# Patient Record
Sex: FEMALE | Race: WHITE | NOT HISPANIC OR LATINO | Employment: FULL TIME | URBAN - METROPOLITAN AREA
[De-identification: names, ages, dates, MRNs, and addresses within clinical notes are randomized per-mention and may not be internally consistent; named-entity substitution may affect disease eponyms.]

---

## 2018-07-05 ENCOUNTER — OCCUPATIONAL MEDICINE (OUTPATIENT)
Dept: URGENT CARE | Facility: CLINIC | Age: 28
End: 2018-07-05
Payer: COMMERCIAL

## 2018-07-05 VITALS
BODY MASS INDEX: 20.2 KG/M2 | SYSTOLIC BLOOD PRESSURE: 108 MMHG | HEIGHT: 63 IN | TEMPERATURE: 98.5 F | WEIGHT: 114 LBS | HEART RATE: 97 BPM | DIASTOLIC BLOOD PRESSURE: 62 MMHG | RESPIRATION RATE: 16 BRPM | OXYGEN SATURATION: 97 %

## 2018-07-05 DIAGNOSIS — S39.012A BACK STRAIN, INITIAL ENCOUNTER: ICD-10-CM

## 2018-07-05 PROCEDURE — 99214 OFFICE O/P EST MOD 30 MIN: CPT | Performed by: FAMILY MEDICINE

## 2018-07-05 ASSESSMENT — ENCOUNTER SYMPTOMS
MYALGIAS: 1
BACK PAIN: 1
FALLS: 0
FEVER: 0
FOCAL WEAKNESS: 0

## 2018-07-05 NOTE — PROGRESS NOTES
"Subjective:      Antoinette Guillen is a 28 y.o. 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 )      DOI 7/3/2018, WYATT-> after lifting several heavy dogs at work noticed the following morning she had non-radiating lower and upper back pain.       HPI    Review of Systems   Constitutional: Negative for fever.   Musculoskeletal: Positive for back pain and myalgias. Negative for falls.   Neurological: Negative for focal weakness.          Objective:     /62   Pulse 97   Temp 36.9 °C (98.5 °F)   Resp 16   Ht 1.6 m (5' 3\")   Wt 51.7 kg (114 lb)   SpO2 97%   BMI 20.19 kg/m²      Physical Exam   Constitutional: She appears well-developed. No distress.   HENT:   Head: Normocephalic and atraumatic.   Neck: Neck supple.   Cardiovascular: Regular rhythm.    No murmur heard.  Pulmonary/Chest: Effort normal. No respiratory distress.   Musculoskeletal:        Arms:  Neurological: She is alert. She exhibits normal muscle tone.   Skin: Skin is warm and dry.   Psychiatric: She has a normal mood and affect. Judgment normal.   Nursing note and vitals reviewed.  Bilateral lower extremity strength and sensory intact.  Negative straight leg raise.   Some pain to palp/rom             Assessment/Plan:         1. Back strain, initial encounter         Dx & d/c instructions discussed w/ patient and/or family members. Follow up as scheduled, sooner if needed, ER if worse.      Possible side effects (i.e. Rash, GI upset/constipation, sedation, elevation of BP or sugars) of any medications given discussed.       - 10lb weight limit  - otc aleve             "

## 2018-07-05 NOTE — LETTER
"EMPLOYEE’S CLAIM FOR COMPENSATION/ REPORT OF INITIAL TREATMENT  FORM C-4    EMPLOYEE’S CLAIM - PROVIDE ALL INFORMATION REQUESTED   First Name  Antoinette Last Name  Mindy Birthdate                    1990                Sex  female Claim Number   Home Address  [address]  Age  28 y.o. Height  1.6 m (5' 3\") Weight  51.7 kg (114 lb) [facility]     [facility] [place] Zip  [address] Telephone  [phone] (home)    Mailing Address  [address] [facility] [place]  [address] Primary Language Spoken  English    Insurer  Unknown Third Party   [employer]   Employee's Occupation (Job Title) When Injury or Occupational Disease Occurred      Employer's Name  [employer]  Telephone  [phone]    Employer Address  [address]  Zip  [address]   Date of Injury  07/04/2018        Hour of Injury  1:45 PM Date Employer Notified  7/4/2018 Last Day of Work after Injury or Occupational Disease  7/4/2018 Supervisor to Whom Injury Reported  Judy Santizo   Address or Location of Accident (if applicable)  [[address] [facility]]   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 (Be specific an answer in detail. Use additional sheet if necessary)  Lifting a new [place]   If you believe that you have an occupational disease, when did you first have knowledge of the disability and it relationship to your employment?  07/04/18 Witnesses to the Accident  Judyjamari Santizo      Nature of Injury or Occupational Disease  Strain  Part(s) of Body Injured or Affected  Lower Back Area (Lumbar Area & Lumbo-Sacral), Defer, Defer    I certify that the above is true and correct to the best of my knowledge and that I have provided this information in order to obtain the benefits of [place]  I hereby authorize any physician, chiropractor, surgeon, practitioner, or other person, any hospital, including [facility] or [facility] hospital, any medical service organization, any insurance company, or other institution or organization to release to each other, any medical or other information, including benefits paid or payable, pertinent to this injury or disease, except information relative to diagnosis, treatment and/or counseling for AIDS, psychological conditions, alcohol or controlled substances, for which I must give specific authorization.  A Photostat of this authorization shall be as valid as the original.     Date   Place   Employee’s Signature   THIS REPORT MUST BE COMPLETED AND MAILED WITHIN 3 WORKING DAYS OF TREATMENT   Place  [facility]  Name of Facility  [facility]   Date  7/5/2018 Diagnosis  (S39.012A) Back strain, initial encounter Is there evidence the injured employee was under the influence of alcohol and/or another controlled substance at the time of accident?   Hour  9:20 AM Description of Injury or Disease  The encounter diagnosis was Back strain, initial encounter. No   Treatment  OTC Aleve  10lb weight limit   Have you advised the patient to remain off work five days or more? No   X-Ray Findings      If Yes   From Date  To Date      From information given by the employee, together with medical evidence, can you directly connect this injury or occupational disease as job incurred?  Yes If No Full Duty  No Modified Duty  Yes   Is additional medical care by a physician indicated?  Yes If Modified Duty, Specify any Limitations / Restrictions  NO LIFTING PULLING PUSHING MORE THEN 10LBS.     Do you know of any previous injury or disease contributing to this condition or occupational disease?                            No   Date  7/5/2018 Print Doctor’s Name Jose Manuel Wei M.D. I certify the  "employer’s copy of  this form was mailed on:   Address  [address]s [address] Only     [place] Zip  [address]    Provider’s Tax ID Number  [id number] Telephone  Dept: [phone]        maldonado-[email or web address] Amos M.D.   e-Signature: Dr. Jl Elam, Medical Director Degree  MD        ORIGINAL-TREATING PHYSICIAN OR CHIROPRACTOR    PAGE 2-INSURER/TPA    PAGE 3-EMPLOYER    PAGE 4-EMPLOYEE             Form C-4 (rev10/07)              BRIEF DESCRIPTION OF RIGHTS AND BENEFITS  (Pursuant to [place] 616C.[place])    Notice of Injury or Occupational Disease (Incident Report Form C-1): If an injury or occupational disease (OD) arises out of and in the  course of employment, you must provide written notice to your employer as soon as practicable, but no later than 7 days after the accident or  OD. Your employer shall maintain a sufficient supply of the required forms.    Claim for Compensation (Form C-4): If medical treatment is sought, the form C-4 is available at the place of initial treatment. A completed  \"Claim for Compensation\" (Form C-4) must be filed within 90 days after an accident or OD. The treating physician or chiropractor must,  within 3 working days after treatment, complete and mail to the employer, the employer's insurer and third-party , the Claim for  Compensation.    Medical Treatment: If you require medical treatment for your on-the-job injury or OD, you may be required to select a physician or  chiropractor from a list provided by your workers’ compensation insurer, if it has contracted with an Organization for Managed Care (MCO) or  Preferred Provider Organization (PPO) or providers of health care. If your employer has not entered into a contract with an MCO or PPO, you  may select a physician or chiropractor from the Panel of Physicians and Chiropractors. Any medical costs related to your industrial injury or  OD will be paid by your insurer.    Temporary Total " Disability (TTD): If your doctor has certified that you are unable to work for a period of at least 5 consecutive days, or 5  cumulative days in a 20-day period, or places restrictions on you that your employer does not accommodate, you may be entitled to TTD  compensation.    Temporary Partial Disability (TPD): If the wage you receive upon reemployment is less than the compensation for TTD to which you are  entitled, the insurer may be required to pay you TPD compensation to make up the difference. TPD can only be paid for a maximum of 24  months.    Permanent Partial Disability (PPD): When your medical condition is stable and there is an indication of a PPD as a result of your injury or  OD, within 30 days, your insurer must arrange for an evaluation by a rating physician or chiropractor to determine the degree of your PPD. The  amount of your PPD award depends on the date of injury, the results of the PPD evaluation and your age and wage.    Permanent Total Disability (PTD): If you are medically certified by a treating physician or chiropractor as permanently and totally disabled  and have been granted a PTD status by your insurer, you are entitled to receive monthly benefits not to exceed 66 2/3% of your average  monthly wage. The amount of your PTD payments is subject to reduction if you previously received a PPD award.    Vocational Rehabilitation Services: You may be eligible for vocational rehabilitation services if you are unable to return to the job due to a  permanent physical impairment or permanent restrictions as a result of your injury or occupational disease.    Transportation and Per Dana Reimbursement: You may be eligible for travel expenses and per dana associated with medical treatment.    Reopening: You may be able to reopen your claim if your condition worsens after claim closure.    Appeal Process: If you disagree with a written determination issued by the insurer or the insurer does not  respond to your request, you may  appeal to the Department of Administration, , by following the instructions contained in your determination letter. You must  appeal the determination within 70 days from the date of the determination letter at [address], [address], [place], [place]  [address], or [address], [place], [place] [address]. If you disagree with the  decision, you may appeal to the  Department of Administration, . You must file your appeal within 30 days from the date of the  decision  letter at [address], [address], [place], [place] [address], or [address], [address], [place], [place] [address]. If you  disagree with a decision of an , you may file a petition for judicial review with the District Court. You must do so within 30  days of the Appeal Officer’s decision. You may be represented by an  at your own expense or you may contact the [facility] for possible  representation.    [place]  for Injured Workers (NAIW): If you disagree with a  decision, you may request that NAIW represent you  without charge at an  Hearing. For information regarding denial of benefits, you may contact the [facility] at: [address], [address], [place] [address], [phone], or [address], [place] [address], [phone]    To File a Complaint with the Division: If you wish to file a complaint with the  of the Division of Industrial Relations (DIR),  please contact the Workers’ Compensation Section, [address], [address], [place], [place] [address], telephone [phone]) [phone], or  [address], [place] [address], telephone [phone].    For assistance with Workers’ Compensation Issues: you may contact the Office of the [facility] Consumer Health Assistance, [address], [address], [place], [place] [address], Toll Free 1-509.435.8956, Web site: http://bree.[facility].nv., E-mail  [email or web address].[email or web address].                                                                                                                                                                                                                                   __________________________________________________________________                                                                   _________________                Employee Name / Signature                                                                                                                                                       Date                                                                                                                                                                                                     D-2 (rev. 10/07)

## 2018-07-05 NOTE — LETTER
[address] [place] [address]  Phone:  [phone] - Fax:  [phone]   [facility] Progress Report and Disability Certification  Date of Service: 7/5/2018   No Show:  No  Date / Time of Next Visit: 7/10/2018@10:50am   Claim Information   Patient Name: Antoinette Guillen  Claim Number:     Employer: [employer]  Date of Injury: 7/3/2018     Insurer / TPA: [employer]  ID / SSN:     Occupation:   Diagnosis: The encounter diagnosis was Back strain, initial encounter.    Medical Information   Related to Industrial Injury? 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     Objective Findings:     Pre-Existing Condition(s):     Assessment:   Initial Visit    Status: Additional Care Required  Permanent Disability:No    Plan:      Diagnostics:      Comments:       Disability Information   Status: Released to Restricted Duty    From:  7/5/2018  Through: 7/10/2018 Restrictions are: Temporary   Physical Restrictions   Sitting:    Standing:    Stooping:    Bending:      Squatting:    Walking:    Climbing:    Pushing:      Pulling:    Other:    Reaching Above Shoulder (L):   Reaching Above Shoulder (R):       Reaching Below Shoulder (L):    Reaching Below Shoulder (R):      Not to exceed Weight Limits   Carrying(hrs):   Weight Limit(lb):   Lifting(hrs):   Weight  Limit(lb):     Comments: NO LIFTING PULLING PUSHING MORE THEN 10LBS     Repetitive Actions   Hands: i.e. Fine Manipulations from Grasping:     Feet: i.e. Operating Foot Controls:     Driving / Operate Machinery:     Physician Name: Jose Manuel Wei M.D. Physician Signature: JOSE MANUEL Natarajan M.D. e-Signature: Dr. Jl Elam, Medical Director   Clinic Name / Location: [address] [place] [address] Clinic Phone  Number: Dept: [phone]   Appointment Time: 9:00 Am Visit Start Time: 9:20 AM   Check-In Time:  9:16 Am Visit Discharge Time: 9:34 Am    Original-Treating Physician or Chiropractor    Page 2-Insurer/TPA    Page 3-Employer    Page 4-Employee

## 2018-07-05 NOTE — LETTER
"EMPLOYEE’S CLAIM FOR COMPENSATION/ REPORT OF INITIAL TREATMENT  FORM C-4    EMPLOYEE’S CLAIM - PROVIDE ALL INFORMATION REQUESTED   First Name  Antoinette Last Name  Mindy Birthdate                    1990                Sex  female Claim Number   Home Address  [address] Age  28 y.o. Height  1.6 m (5' 3\") Weight  51.7 kg (114 lb) [facility]     [place] Zip  [address] Telephone  [phone] (home)    Mailing Address  [address] [place] Zip  [address] Primary Language Spoken  English    Insurer  unknown Third Party   [employer]   Employee's Occupation (Job Title) When Injury or Occupational Disease Occurred      Employer's Name  [employer]  Telephone  [phone]    Employer Address  [address]  Zip  [address]    Date of Injury  7/3/2018               Hour of Injury  1:45 PM Date Employer Notified  7/4/2018 Last Day of Work after Injury or Occupational Disease  7/4/2018 Supervisor to Whom Injury Reported  Judy Santizo   Address or Location of Accident (if applicable)  [[address]]   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 (Be specific an answer in detail. Use additional sheet if necessary)  Lifting a new [place]   If you believe that you have an occupational disease, when did you first have knowledge of the disability and it relationship to your employment?  07/04/18 Witnesses to the Accident  Judyjamari Santizo      Nature of Injury or Occupational Disease  Strain  Part(s) of Body Injured or Affected  Lower Back Area (Lumbar Area & Lumbo-Sacral), Defer, Defer    I certify that the above is true and correct to the best of my knowledge and that I have provided this information in order to obtain the benefits of [place]’s [place] Insurance and " [place]).  I hereby authorize any physician, chiropractor, surgeon, practitioner, or other person, any hospital, including [facility] or [facility] hospital, any medical service organization, any insurance company, or other institution or organization to release to each other, any medical or other information, including benefits paid or payable, pertinent to this injury or disease, except information relative to diagnosis, treatment and/or counseling for AIDS, psychological conditions, alcohol or controlled substances, for which I must give specific authorization.  A Photostat of this authorization shall be as valid as the original.     Date   Place   Employee’s Signature   THIS REPORT MUST BE COMPLETED AND MAILED WITHIN 3 WORKING DAYS OF TREATMENT   Place  [facility]  Name of Facility  [facility]   Date  7/5/2018 Diagnosis  (S39.012A) Back strain, initial encounter Is there evidence the injured employee was under the influence of alcohol and/or another controlled substance at the time of accident?   Hour  9:20 AM Description of Injury or Disease  The encounter diagnosis was Back strain, initial encounter. No   Treatment  OTC Aleve  10lb weight limit   Have you advised the patient to remain off work five days or more? No   X-Ray Findings      If Yes   From Date  To Date      From information given by the employee, together with medical evidence, can you directly connect this injury or occupational disease as job incurred?  Yes If No Full Duty  No Modified Duty  Yes   Is additional medical care by a physician indicated?  Yes If Modified Duty, Specify any Limitations / Restrictions  NO LIFTING PULLING PUSHING MORE THEN 10LBS.     Do you know of any previous injury or disease contributing to this condition or occupational disease?                            No   Date  7/5/2018 Print Doctor’s Name Jose Manuel Wei M.D. I  "certify the employer’s copy of  this form was mailed on:   Address  [address]’s Use Only     [place] Zip  [address]    Provider’s Tax ID Number  [id number] Telephone  Dept: [phone]        maldonado-[email or web address] Amos M.D.   e-Signature: Dr. Jl Elam, Medical Director Degree  MD        ORIGINAL-TREATING PHYSICIAN OR CHIROPRACTOR    PAGE 2-INSURER/TPA    PAGE 3-EMPLOYER    PAGE 4-EMPLOYEE             Form C-4 (rev10/07)              BRIEF DESCRIPTION OF RIGHTS AND BENEFITS  (Pursuant to [place] 616C.050)    Notice of Injury or Occupational Disease (Incident Report Form C-1): If an injury or occupational disease (OD) arises out of and in the  course of employment, you must provide written notice to your employer as soon as practicable, but no later than 7 days after the accident or  OD. Your employer shall maintain a sufficient supply of the required forms.    Claim for Compensation (Form C-4): If medical treatment is sought, the form C-4 is available at the place of initial treatment. A completed  \"Claim for Compensation\" (Form C-4) must be filed within 90 days after an accident or OD. The treating physician or chiropractor must,  within 3 working days after treatment, complete and mail to the employer, the employer's insurer and third-party , the Claim for  Compensation.    Medical Treatment: If you require medical treatment for your on-the-job injury or OD, you may be required to select a physician or  chiropractor from a list provided by your workers’ compensation insurer, if it has contracted with an Organization for Managed Care (MCO) or  Preferred Provider Organization (PPO) or providers of health care. If your employer has not entered into a contract with an MCO or PPO, you  may select a physician or chiropractor from the Panel of Physicians and Chiropractors. Any medical costs related to your industrial injury or  OD will be paid by your insurer.    Temporary " Total Disability (TTD): If your doctor has certified that you are unable to work for a period of at least 5 consecutive days, or 5  cumulative days in a 20-day period, or places restrictions on you that your employer does not accommodate, you may be entitled to TTD  compensation.    Temporary Partial Disability (TPD): If the wage you receive upon reemployment is less than the compensation for TTD to which you are  entitled, the insurer may be required to pay you TPD compensation to make up the difference. TPD can only be paid for a maximum of 24  months.    Permanent Partial Disability (PPD): When your medical condition is stable and there is an indication of a PPD as a result of your injury or  OD, within 30 days, your insurer must arrange for an evaluation by a rating physician or chiropractor to determine the degree of your PPD. The  amount of your PPD award depends on the date of injury, the results of the PPD evaluation and your age and wage.    Permanent Total Disability (PTD): If you are medically certified by a treating physician or chiropractor as permanently and totally disabled  and have been granted a PTD status by your insurer, you are entitled to receive monthly benefits not to exceed 66 2/3% of your average  monthly wage. The amount of your PTD payments is subject to reduction if you previously received a PPD award.    Vocational Rehabilitation Services: You may be eligible for vocational rehabilitation services if you are unable to return to the job due to a  permanent physical impairment or permanent restrictions as a result of your injury or occupational disease.    Transportation and Per Dana Reimbursement: You may be eligible for travel expenses and per dana associated with medical treatment.    Reopening: You may be able to reopen your claim if your condition worsens after claim closure.    Appeal Process: If you disagree with a written determination issued by the insurer or the insurer does not  respond to your request, you may  appeal to the Department of Administration, , by following the instructions contained in your determination letter. You must  appeal the determination within 70 days from the date of the determination letter at [address], [address], [place], [place]  [address], or [address], [address], [place], [place] [address]. If you disagree with the  decision, you may appeal to the  Department of Administration, . You must file your appeal within 30 days from the date of the  decision  letter at [address], [address], [place], [place] [address], or [address], [address], [place], [place] [address]. If you  disagree with a decision of an , you may file a petition for judicial review with the District Court. You must do so within 30  days of the Appeal Officer’s decision. You may be represented by an  at your own expense or you may contact the [facility] for possible  representation.    [place]  for Injured Workers (NAIW): If you disagree with a  decision, you may request that NAIW represent you  without charge at an  Hearing. For information regarding denial of benefits, you may contact the [facility] at: [address], [address], [place] [address], [phone], or [address], [address], [place] [address], [phone]    To File a Complaint with the Division: If you wish to file a complaint with the  of the Division of Industrial Relations (DIR),  please contact the Workers’ Compensation Section, [address], [address], [place], [place] [address], telephone [phone], or  [address], [place] [address], telephone [phone].    For assistance with Workers’ Compensation Issues: you may contact the Office of the [facility] Consumer Health Assistance, [address], [address], [place], [place] [address], Toll Free 1-704.881.6697, Web site: http://[email or web address].[email or web address]., E-mail  [email or web address].                                                                                                                                                                                                                                   __________________________________________________________________                                                                   _________________                Employee Name / Signature                                                                                                                                                       Date                                                                                                                                                                                                     D-2 (rev. 10/07)

## 2018-07-10 ENCOUNTER — OCCUPATIONAL MEDICINE (OUTPATIENT)
Dept: OCCUPATIONAL MEDICINE | Facility: CLINIC | Age: 28
End: 2018-07-10
Payer: COMMERCIAL

## 2018-07-10 VITALS
HEIGHT: 63 IN | SYSTOLIC BLOOD PRESSURE: 104 MMHG | HEART RATE: 90 BPM | DIASTOLIC BLOOD PRESSURE: 52 MMHG | WEIGHT: 114 LBS | BODY MASS INDEX: 20.2 KG/M2 | TEMPERATURE: 98.6 F | OXYGEN SATURATION: 99 % | RESPIRATION RATE: 12 BRPM

## 2018-07-10 DIAGNOSIS — S39.012D STRAIN OF LUMBAR REGION, SUBSEQUENT ENCOUNTER: ICD-10-CM

## 2018-07-10 PROCEDURE — 99202 OFFICE O/P NEW SF 15 MIN: CPT | Performed by: PREVENTIVE MEDICINE

## 2018-07-10 ASSESSMENT — PAIN SCALES - GENERAL: PAINLEVEL: 3=SLIGHT PAIN

## 2018-07-10 NOTE — PROGRESS NOTES
"Subjective:      Antoinette Guillen is a 28 y.o. female who presents with Follow-Up ( DOI 07/04/2018 - Back - better - room 2)      DOI 7/4/2018: 29 yo female presents for lower back pain.  She was lifting a large dog in order to groom and felt a pull in her lower back.  She was seen in the urgent care and advised NSAIDs and work restrictions.  She states over the past few days her pain has improved significantly.  She states she only has minimal pain with lifting and certain movements.  Pain relief with over-the-counter NSAIDs.  She feels ready to return to full duty.  Denies any radiating pain, numbness or tingling.     HPI    ROS  ROS: All systems were reviewed on intake form, form was reviewed and signed. See scanned documents in media. Pertinent positives and negatives included in HPI.    PMH: No pertinent past medical history to this problem  MEDS: Medications were reviewed in Epic  ALLERGIES: No Known Allergies  SOCHX: Works as a  at [employer]   FH: No pertinent family history to this problem     Objective:     /52   Pulse 90   Temp 37 °C (98.6 °F)   Resp 12   Ht 1.6 m (5' 3\")   Wt 51.7 kg (114 lb)   SpO2 99%   BMI 20.19 kg/m²      Physical Exam   Constitutional: She is oriented to person, place, and time. She appears well-developed and well-nourished.   Cardiovascular: Normal rate.    Pulmonary/Chest: Effort normal.   Neurological: She is alert and oriented to person, place, and time.   Skin: Skin is warm and dry.   Psychiatric: She has a normal mood and affect. Judgment normal.       Lumbar: No gross deformity.  No tenderness to palpation.  Full range of motion.  Normal gait.  Reflexes intact.  Strength intact.       Assessment/Plan:     1. Strain of lumbar region, subsequent encounter  Released from care  Full duty  Follow-up as needed      "

## 2018-07-10 NOTE — LETTER
[address],   [address] [address]  Phone:  [phone] - Fax:  [phone]   [facility] Progress Report and Disability Certification  Date of Service: 7/10/2018   No Show:  No  Date / Time of Next Visit:  Release from care   Claim Information   Patient Name: Antoinette Guillen  Claim Number:     Employer: [employer]  Date of Injury: 7/4/2018     Insurer / TPA: [employer]  ID / SSN:     Occupation:   Diagnosis: The encounter diagnosis was Strain of lumbar region, subsequent encounter.    Medical Information   Related to Industrial Injury? Yes    Subjective Complaints:  DOI 7/4/2018: 29 yo female presents for lower back pain.  She was lifting a large dog in order to groom and felt a pull in her lower back.  She was seen in the urgent care and advised NSAIDs and work restrictions.  She states over the past few days her pain has improved significantly.  She states she only has minimal pain with lifting and certain movements.  Pain relief with over-the-counter NSAIDs.  She feels ready to return to full duty.  Denies any radiating pain, numbness or tingling.   Objective Findings: Lumbar: No gross deformity.  No tenderness to palpation.  Full range of motion.  Normal gait.  Reflexes intact.  Strength intact.   Pre-Existing Condition(s):     Assessment:   Condition Improved    Status: Discharged /  MMI  Permanent Disability:No    Plan:      Diagnostics:      Comments:  Released from care  Full duty  Follow-up as needed    Disability Information   Status: Released to Full Duty    From:  7/10/2018  Through:   Restrictions are:     Physical Restrictions   Sitting:    Standing:    Stooping:    Bending:      Squatting:    Walking:    Climbing:    Pushing:      Pulling:    Other:    Reaching Above Shoulder (L):   Reaching Above Shoulder (R):       Reaching Below Shoulder (L):    Reaching Below Shoulder (R):      Not to exceed  Weight Limits   Carrying(hrs):   Weight Limit(lb):   Lifting(hrs):   Weight  Limit(lb):     Comments:      Repetitive Actions   Hands: i.e. Fine Manipulations from Grasping:     Feet: i.e. Operating Foot Controls:     Driving / Operate Machinery:     Physician Name: Kai Lay D.O. Physician Signature: minSignTAKAI WASHINGTON D.O. e-Signature: Dr. Jl Elam, Medical Director   Clinic Name / Location: [address],   [address] [place] [address] Clinic Phone Number: Dept: [phone]   Appointment Time: 10:50 Am Visit Start Time: 10:53 AM   Check-In Time:  10:46 Am Visit Discharge Time:  11:27am   Original-Treating Physician or Chiropractor    Page 2-Insurer/TPA    Page 3-Employer    Page 4-Employee

## 2018-08-01 ENCOUNTER — NON-PROVIDER VISIT (OUTPATIENT)
Dept: OCCUPATIONAL MEDICINE | Facility: CLINIC | Age: 28
End: 2018-08-01

## 2018-08-01 DIAGNOSIS — Z02.1 DRUG TESTING, PRE-EMPLOYMENT: ICD-10-CM

## 2018-08-01 DIAGNOSIS — Z02.1 PRE-EMPLOYMENT DRUG SCREENING: ICD-10-CM

## 2018-08-01 LAB
AMP AMPHETAMINE: NORMAL
COC COCAINE: NORMAL
INT CON NEG: NORMAL
INT CON POS: NORMAL
MET METHAMPHETAMINES: NORMAL
OPI OPIATES: NORMAL
PCP PHENCYCLIDINE: NORMAL
POC DRUG COMMENT 753798-OCCUPATIONAL HEALTH: NEGATIVE
THC: NORMAL

## 2018-08-01 PROCEDURE — 80305 DRUG TEST PRSMV DIR OPT OBS: CPT | Performed by: PREVENTIVE MEDICINE

## 2018-11-17 ENCOUNTER — HOSPITAL ENCOUNTER (OUTPATIENT)
Facility: MEDICAL CENTER | Age: 28
End: 2018-11-17
Attending: PHYSICIAN ASSISTANT
Payer: COMMERCIAL

## 2018-11-17 ENCOUNTER — OFFICE VISIT (OUTPATIENT)
Dept: URGENT CARE | Facility: PHYSICIAN GROUP | Age: 28
End: 2018-11-17
Payer: COMMERCIAL

## 2018-11-17 VITALS
HEART RATE: 78 BPM | OXYGEN SATURATION: 93 % | BODY MASS INDEX: 18.61 KG/M2 | RESPIRATION RATE: 14 BRPM | HEIGHT: 63 IN | TEMPERATURE: 97.5 F | DIASTOLIC BLOOD PRESSURE: 68 MMHG | WEIGHT: 105 LBS | SYSTOLIC BLOOD PRESSURE: 100 MMHG

## 2018-11-17 DIAGNOSIS — R58 ECCHYMOSIS: ICD-10-CM

## 2018-11-17 DIAGNOSIS — R63.4 UNEXPLAINED WEIGHT LOSS: ICD-10-CM

## 2018-11-17 LAB
ALBUMIN SERPL BCP-MCNC: 4.5 G/DL (ref 3.2–4.9)
ALBUMIN/GLOB SERPL: 1.7 G/DL
ALP SERPL-CCNC: 30 U/L (ref 30–99)
ALT SERPL-CCNC: 16 U/L (ref 2–50)
ANION GAP SERPL CALC-SCNC: 6 MMOL/L (ref 0–11.9)
APTT PPP: 29.5 SEC (ref 24.7–36)
AST SERPL-CCNC: 20 U/L (ref 12–45)
BASOPHILS # BLD AUTO: 0.2 % (ref 0–1.8)
BASOPHILS # BLD: 0.01 K/UL (ref 0–0.12)
BILIRUB SERPL-MCNC: 0.5 MG/DL (ref 0.1–1.5)
BUN SERPL-MCNC: 11 MG/DL (ref 8–22)
CALCIUM SERPL-MCNC: 9.3 MG/DL (ref 8.5–10.5)
CHLORIDE SERPL-SCNC: 108 MMOL/L (ref 96–112)
CO2 SERPL-SCNC: 26 MMOL/L (ref 20–33)
CREAT SERPL-MCNC: 0.65 MG/DL (ref 0.5–1.4)
EOSINOPHIL # BLD AUTO: 0.04 K/UL (ref 0–0.51)
EOSINOPHIL NFR BLD: 0.8 % (ref 0–6.9)
ERYTHROCYTE [DISTWIDTH] IN BLOOD BY AUTOMATED COUNT: 44.9 FL (ref 35.9–50)
GLOBULIN SER CALC-MCNC: 2.6 G/DL (ref 1.9–3.5)
GLUCOSE SERPL-MCNC: 82 MG/DL (ref 65–99)
HCT VFR BLD AUTO: 41 % (ref 37–47)
HGB BLD-MCNC: 13.3 G/DL (ref 12–16)
IMM GRANULOCYTES # BLD AUTO: 0.02 K/UL (ref 0–0.11)
IMM GRANULOCYTES NFR BLD AUTO: 0.4 % (ref 0–0.9)
INR PPP: 1.21 (ref 0.87–1.13)
LYMPHOCYTES # BLD AUTO: 1.24 K/UL (ref 1–4.8)
LYMPHOCYTES NFR BLD: 23.6 % (ref 22–41)
MCH RBC QN AUTO: 29 PG (ref 27–33)
MCHC RBC AUTO-ENTMCNC: 32.4 G/DL (ref 33.6–35)
MCV RBC AUTO: 89.5 FL (ref 81.4–97.8)
MONOCYTES # BLD AUTO: 0.33 K/UL (ref 0–0.85)
MONOCYTES NFR BLD AUTO: 6.3 % (ref 0–13.4)
NEUTROPHILS # BLD AUTO: 3.62 K/UL (ref 2–7.15)
NEUTROPHILS NFR BLD: 68.7 % (ref 44–72)
NRBC # BLD AUTO: 0 K/UL
NRBC BLD-RTO: 0 /100 WBC
PLATELET # BLD AUTO: 209 K/UL (ref 164–446)
PMV BLD AUTO: 10.8 FL (ref 9–12.9)
POTASSIUM SERPL-SCNC: 3.7 MMOL/L (ref 3.6–5.5)
PROT SERPL-MCNC: 7.1 G/DL (ref 6–8.2)
PROTHROMBIN TIME: 15.4 SEC (ref 12–14.6)
RBC # BLD AUTO: 4.58 M/UL (ref 4.2–5.4)
SODIUM SERPL-SCNC: 140 MMOL/L (ref 135–145)
WBC # BLD AUTO: 5.3 K/UL (ref 4.8–10.8)

## 2018-11-17 PROCEDURE — 85025 COMPLETE CBC W/AUTO DIFF WBC: CPT

## 2018-11-17 PROCEDURE — 99214 OFFICE O/P EST MOD 30 MIN: CPT | Performed by: PHYSICIAN ASSISTANT

## 2018-11-17 PROCEDURE — 80053 COMPREHEN METABOLIC PANEL: CPT

## 2018-11-17 PROCEDURE — 85610 PROTHROMBIN TIME: CPT

## 2018-11-17 PROCEDURE — 85730 THROMBOPLASTIN TIME PARTIAL: CPT

## 2018-11-17 ASSESSMENT — ENCOUNTER SYMPTOMS
DIARRHEA: 0
FALLS: 0
ABDOMINAL PAIN: 0
NAUSEA: 0
FEVER: 0
CHILLS: 0
WEIGHT LOSS: 1
VOMITING: 0
SHORTNESS OF BREATH: 0
DIZZINESS: 0

## 2018-11-17 NOTE — PROGRESS NOTES
"Subjective:      Antoinette Guillen is a 28 y.o. female who presents with Leg Injury (leg brusies  all over )            HPI   Patient presents to urgent care reporting non-traumatic bruising of her legs starting over the past few days. They area non-tender. No history of easy bruising in the past. She also reports she has lost about 10 lbs in the past 2 months unintentionally and has been experiencing night sweats and fatigue. No fevers, chills, body aches, headaches, dizziness, nausea, vomiting, or abdominal pain. She denies family history of bleeding disorders. She has no known medical problems and is UTD on all routine vaccinations.     Review of Systems   Constitutional: Positive for malaise/fatigue and weight loss. Negative for chills and fever.        + night sweats   HENT: Negative for congestion.    Respiratory: Negative for shortness of breath.    Cardiovascular: Negative for chest pain.   Gastrointestinal: Negative for abdominal pain, diarrhea, nausea and vomiting.   Genitourinary: Negative.    Musculoskeletal: Negative for falls and joint pain.   Neurological: Negative for dizziness.        Objective:     /68   Pulse 78   Temp 36.4 °C (97.5 °F) (Temporal)   Resp 14   Ht 1.6 m (5' 3\")   Wt 47.6 kg (105 lb)   SpO2 93%   BMI 18.60 kg/m²      Physical Exam   Constitutional: She is oriented to person, place, and time. She appears well-developed and well-nourished. No distress.   HENT:   Head: Normocephalic and atraumatic.   Eyes: Pupils are equal, round, and reactive to light.   Neck: Normal range of motion.   Cardiovascular: Normal rate.    Pulmonary/Chest: Effort normal.   Musculoskeletal: Normal range of motion.   Neurological: She is alert and oriented to person, place, and time.   Skin: Skin is warm and dry. Ecchymosis noted. She is not diaphoretic.        Significant amount of ecchymosis present on bilateral anterior lower extremities and scant ecchymosis present on bilateral forearms.  "   Psychiatric: She has a normal mood and affect. Her behavior is normal.   Nursing note and vitals reviewed.         PMH:  has no past medical history on file.  MEDS:   Current Outpatient Prescriptions:   •  Etonogestrel (NEXPLANON SC), Inject  as instructed., Disp: , Rfl:   ALLERGIES: No Known Allergies  SURGHX: History reviewed. No pertinent surgical history.  SOCHX:  has an unknown smoking status. She has never used smokeless tobacco. She reports that she uses drugs, including Marijuana. She reports that she does not drink alcohol.  FH: family history is not on file.     Assessment/Plan:     1. Ecchymosis  - CBC WITH DIFFERENTIAL; Future  - COMP METABOLIC PANEL; Future  - PT AND PTT    2. Unexplained weight loss    Will obtain blood work at today's visit, pending results. The patient demonstrated a good understanding and agreed with the treatment plan.

## 2018-11-19 ENCOUNTER — TELEPHONE (OUTPATIENT)
Dept: URGENT CARE | Facility: PHYSICIAN GROUP | Age: 28
End: 2018-11-19

## 2018-11-19 NOTE — TELEPHONE ENCOUNTER
"Attempted to contact patient regarding blood work results, \"this person is not accepting calls at this time\".   Will attempt to contact at another time.   "

## 2018-11-20 ENCOUNTER — TELEPHONE (OUTPATIENT)
Dept: URGENT CARE | Facility: PHYSICIAN GROUP | Age: 28
End: 2018-11-20

## 2018-11-20 DIAGNOSIS — R23.3 SPONTANEOUS ECCHYMOSIS: ICD-10-CM

## 2018-11-20 NOTE — TELEPHONE ENCOUNTER
Spoke to patient and informed her of blood work results. Slightly elevated INR, otherwise normal. She states she is still experiencing night sweats. The bruising is unchanged. Will place referral to follow up with hematology. She states understanding and appreciates the phone call.

## 2018-11-27 ENCOUNTER — HOSPITAL ENCOUNTER (OUTPATIENT)
Dept: LAB | Facility: MEDICAL CENTER | Age: 28
End: 2018-11-27
Attending: INTERNAL MEDICINE
Payer: COMMERCIAL

## 2018-11-27 ENCOUNTER — OFFICE VISIT (OUTPATIENT)
Dept: HEMATOLOGY ONCOLOGY | Facility: MEDICAL CENTER | Age: 28
End: 2018-11-27
Payer: COMMERCIAL

## 2018-11-27 VITALS
WEIGHT: 108.8 LBS | BODY MASS INDEX: 19.28 KG/M2 | TEMPERATURE: 98 F | HEART RATE: 66 BPM | OXYGEN SATURATION: 99 % | SYSTOLIC BLOOD PRESSURE: 102 MMHG | DIASTOLIC BLOOD PRESSURE: 60 MMHG | RESPIRATION RATE: 14 BRPM | HEIGHT: 63 IN

## 2018-11-27 DIAGNOSIS — T14.8XXA BRUISING: ICD-10-CM

## 2018-11-27 DIAGNOSIS — T14.8XXA BRUISING: Primary | ICD-10-CM

## 2018-11-27 LAB
INR PPP: 1.21 (ref 0.87–1.13)
MEDICATIONS NOTED 1688: NORMAL
PLT FUNCTION COL/EPI  1661: 157 SEC (ref 83–170)
PROTHROMBIN TIME: 13.2 SEC (ref 12–14.6)
PROTHROMBIN TIME: 15.4 SEC (ref 12–14.6)
PT IMM NP PPP: 13.8 S (ref 12–14.6)

## 2018-11-27 PROCEDURE — 36415 COLL VENOUS BLD VENIPUNCTURE: CPT

## 2018-11-27 PROCEDURE — 85245 CLOT FACTOR VIII VW RISTOCTN: CPT

## 2018-11-27 PROCEDURE — 99204 OFFICE O/P NEW MOD 45 MIN: CPT | Performed by: INTERNAL MEDICINE

## 2018-11-27 PROCEDURE — 85230 CLOT FACTOR VII PROCONVERTIN: CPT

## 2018-11-27 PROCEDURE — 85246 CLOT FACTOR VIII VW ANTIGEN: CPT

## 2018-11-27 PROCEDURE — 85240 CLOT FACTOR VIII AHG 1 STAGE: CPT

## 2018-11-27 PROCEDURE — 85576 BLOOD PLATELET AGGREGATION: CPT

## 2018-11-27 PROCEDURE — 85611 PROTHROMBIN TEST: CPT

## 2018-11-27 ASSESSMENT — PAIN SCALES - GENERAL: PAINLEVEL: 3=SLIGHT PAIN

## 2018-11-27 NOTE — PROGRESS NOTES
Consult Note: Hematology    Date of consultation: 11/27/2018 9:02 AM    Referring provider: Dang Mccoy P.A.,    Reason for consultation: Excessive bruising.    History of presenting illness:     Dear  Dang Mccoy P.A,    Thank you very much for allowing me to see  Antoinette Nye today. As you know she is a 28 y.o. year old woman who recently presented at urgent care for evaluation of excessive upper and lower extremity bruising. She reports no trauma or excessive physical activity preceding the day she woke up to find multiple large bruises covering her entire legs and less so on the arms. She does carry a tray at work and reports this might be the only trauma to her arms. She otherwise stands for her work but does not lift anything heavy or bump against anything. She recently started developing minor headaches and did take up to 3 over the counter ibuprofen tablets on a few occasions. She does not take any over the counter supplements otherwise. She denies bleeding episodes. Her bruises are currently significantly improved.  She also reports around 15 pound unintentional weight loss over the past year. She states food is not as appetizing to her and she may have skipped meals. Denies alcohol or drug use. Denies history of liver issues. Denies use of coumarin like products or derivatives including warfarin.     Past Medical History:    Bruising    Allergies:  Patient has no known allergies.    Medications:    Current Outpatient Prescriptions   Medication Sig Dispense Refill   • Etonogestrel (NEXPLANON SC) Inject  as instructed.       No current facility-administered medications for this visit.        Social History:     Social History     Social History   • Marital status: Single     Spouse name: N/A   • Number of children: N/A   • Years of education: N/A     Occupational History   • Not on file.     Social History Main Topics   • Smoking status: Unknown If Ever Smoked   • Smokeless tobacco: Never Used  "  • Alcohol use No   • Drug use: Yes     Types: Marijuana      Comment: occ   • Sexual activity: Not on file     Other Topics Concern   • Not on file     Social History Narrative   • No narrative on file       Family History:   Denies family history of bleeding.    Review of Systems:  Constitutional: No fever, chills, weight loss ,malaise/fatigue.    HEENT: No new auditory or visual complaints. No sore throat and neck pain.     Respiratory:No new cough, sputum production, shortness of breath and wheezing.    Cardiovascular: No new chest pain, palpitations, orthopnea and leg swelling.    Gastrointestinal: No heartburn, nausea, vomiting ,abdominal pain, hematochezia or melena     Genitourinary: Negative for dysuria, hematuria    Musculoskeletal: No new arthralgias or myalgias   Skin: Improving bruising.  Neurological: Negative for focal weakness or headaches.    Endo/Heme/Allergies: No abnormal bleed/bruise.    Psychiatric/Behavioral: No new depression/anxiety.    Physical Exam:  /60 (BP Location: Right arm, Patient Position: Sitting)   Pulse 66   Temp 36.7 °C (98 °F) (Oral)   Resp 14   Ht 1.6 m (5' 3\")   Wt 49.3 kg (108 lb 12.8 oz)   SpO2 99%   BMI 19.27 kg/m²   General: No acute distress.  Eyes: Normal conjuctiva and lids. No icterus.  HEENT: Oropharynx clear.   Neck: Supple with no palpable masses.  Lymph nodes: No palpable cervical, supraclavicular or axillary lymphadenopathy.    CVS: regular rate and rhythm, no rubs or gallops.   RESP: Clear to auscultation bilaterally with normal respiratory effort.   ABD: Soft, non tender, non distended, normal bowel sounds, no palpable organomegaly  EXT: Picture of extensive bruising from last week showing impressive, large multiple bruises covering both sides of entire lower extremities. Currently with multiple healing bruises on arms and legs.  CNS: Alert and oriented x3, No focal deficits.  Skin: No rash on visible skin.      Labs:   No results for input(s): " RBC, HEMOGLOBIN, HEMATOCRIT, PLATELETCT, PROTHROMBTM, APTT, INR, IRON, FERRITIN, TOTIRONBC in the last 72 hours.  Lab Results   Component Value Date/Time    SODIUM 140 11/17/2018 02:54 PM    POTASSIUM 3.7 11/17/2018 02:54 PM    CHLORIDE 108 11/17/2018 02:54 PM    CO2 26 11/17/2018 02:54 PM    GLUCOSE 82 11/17/2018 02:54 PM    BUN 11 11/17/2018 02:54 PM    CREATININE 0.65 11/17/2018 02:54 PM        Assessment and Plan:  Antoinette Guillen is a 28 year old woman with no significant past medical history who recently presented with new onset significant bruising limited to upper and lower extremities in the absence of trauma and other evidence of bleeding and no personal or family history of unexpected bleeding.     Recent labs were reviewed showing normal CBC and CMP with slight prolongation in PT. Patient denies coumadin use and her recent hepatic panel was normal which makes significant liver disease unlikely. Her recent increase in use of ibuprofen due to new headaches raises the possibility of platelet inhibition with resulting bruising. Of note, last ibuprofen patient reports taking over three weeks ago.  I will recheck her PT and add a PTT today with mixing studies to begin a work up. Von Willebrand disease panel will be obtained. Rarer causes of prolonged PT include factor VII inhibitors or congenital deficiency. A mixing study will be helpful in this evaluation as well as factor VII activity level. Patient will also have platelet function studies for further evaluation of her bruising.    At current time my recommendation is to avoid ibuprofen or NSAID's due to their platelet inhibiting effects. She was instructed to proceed with the ER with excessive nosebleeds or bleeding elsewhere. She will return for reevaluation in 2 - 3 weeks.    Recommendations:  - PT, PTT, mixing studies, VWD panel, platelet function analysis  - factor VII activity  - avoid ibuprofen and other NSAID's  - return to clinic in 2 -3  Weeks or  sooner if needed    She agreed and verbalized her agreement and understanding with the current plan.  I answered all questions and concerns she has at this time.              Thank you for allowing me to participate in her care.          SIGNATURES:  Mike Serra    CC:  Pcp Pt States None  Dang Mccoy, P.A.-*

## 2018-11-29 LAB
FACT VII ACT/NOR PPP: 55 % (ref 80–181)
FACT VIII ACT/NOR PPP: 117 % (ref 56–191)
VWF AG ACT/NOR PPP IA: 123 % (ref 52–214)
VWF:RCO ACT/NOR PPP PL AGG: 133 % (ref 51–215)

## 2018-12-12 ENCOUNTER — OFFICE VISIT (OUTPATIENT)
Dept: HEMATOLOGY ONCOLOGY | Facility: MEDICAL CENTER | Age: 28
End: 2018-12-12
Payer: COMMERCIAL

## 2018-12-12 VITALS
SYSTOLIC BLOOD PRESSURE: 102 MMHG | TEMPERATURE: 99.1 F | WEIGHT: 104.28 LBS | HEART RATE: 91 BPM | RESPIRATION RATE: 18 BRPM | OXYGEN SATURATION: 98 % | DIASTOLIC BLOOD PRESSURE: 58 MMHG | BODY MASS INDEX: 18.48 KG/M2 | HEIGHT: 63 IN

## 2018-12-12 DIAGNOSIS — R63.4 WEIGHT LOSS, UNINTENTIONAL: ICD-10-CM

## 2018-12-12 DIAGNOSIS — T14.8XXA BRUISING: ICD-10-CM

## 2018-12-12 DIAGNOSIS — D68.2 FACTOR VII DEFICIENCY (HCC): ICD-10-CM

## 2018-12-12 PROCEDURE — 99213 OFFICE O/P EST LOW 20 MIN: CPT | Performed by: INTERNAL MEDICINE

## 2018-12-12 ASSESSMENT — PAIN SCALES - GENERAL: PAINLEVEL: NO PAIN

## 2018-12-12 NOTE — PROGRESS NOTES
Date of visit: 12/12/2018  12:46 PM    Chief Complaint: Excessive bruising.    Identification/Prior relevant history:   Antoinette Guillen is a 28 year old woman who recently presented at urgent care for evaluation of excessive upper and lower extremity bruising. She reports no trauma or excessive physical activity preceding the day she woke up to find multiple large bruises covering her entire legs and less so on the arms. She also reports around 15 pound unintentional weight loss over the past year. She states food is not as appetizing to her and she may have skipped meals. Denies alcohol or drug use. Denies history of liver issues. Denies use of coumarin like products or derivatives including warfarin.     Interim history  Patient continues to report healing bruises with no new bruising at current time. Denies history of bleeding in the past including ulnar surgery at age 21. Denies heavy menstrual periods or excessive bruising with falls as a child. She attributes recent weight loss to her new job which requires significant amount of walking. She states she eats a varied diet but has no appetite.    Past Medical History:      No past medical history.    Past surgical history:       Ulnar surgery at age 21.    Allergies:         Patient has no known allergies.    Medications:         Current Outpatient Prescriptions   Medication Sig Dispense Refill   • Etonogestrel (NEXPLANON SC) Inject  as instructed.       No current facility-administered medications for this visit.        Social History:     Social History     Social History   • Marital status: Single     Spouse name: N/A   • Number of children: N/A   • Years of education: N/A     Occupational History   • Not on file.     Social History Main Topics   • Smoking status: Unknown If Ever Smoked   • Smokeless tobacco: Never Used   • Alcohol use No   • Drug use: Yes     Types: Marijuana      Comment: occ   • Sexual activity: Not on file     Other Topics Concern   •  "Not on file     Social History Narrative   • No narrative on file       Family History:      No family history of bleeding.    Review of Systems:  Constitutional: Negative for fever, chills, weight loss and malaise/fatigue.    HEENT: No new auditory or visual complaints. No sore throat and neck pain.     Respiratory: Negative for cough, sputum production, shortness of breath and wheezing.    Cardiovascular: Negative for chest pain, palpitations, orthopnea and leg swelling.    Gastrointestinal: Negative for heartburn, nausea, vomiting and abdominal pain.    Genitourinary: Negative for dysuria, hematuria    Musculoskeletal: No new arthralgias or myalgias   Skin: Negative for rash and itching.    Neurological: Negative for focal weakness and headaches.    Endo/Heme/Allergies: No abnormal bleed/bruise.    Psychiatric/Behavioral: No new depression/anxiety.    Physical Exam:  /58 (BP Location: Right arm, Patient Position: Sitting, BP Cuff Size: Adult)   Pulse 91   Temp 37.3 °C (99.1 °F) (Temporal)   Resp 18   Ht 1.6 m (5' 3\")   Wt 47.3 kg (104 lb 4.4 oz)   SpO2 98%   BMI 18.47 kg/m²   General: No acute distress.  Eyes: Normal conjuctiva and lids. No icterus.  RESP: normal respiratory effort.   ABD: non distendedEXT: No edema or cyanosis.  CNS: Alert and oriented x3, No focal deficits.  Skin: No rash on visible skin.      Labs:   No visits with results within 1 Week(s) from this visit.   Latest known visit with results is:   Hospital Outpatient Visit on 11/27/2018   Component Date Value Ref Range Status   • Factor VIII Activity 11/27/2018 117  56 - 191 % Final    Comment: REFERENCE INTERVAL: Factor VIII, Activity  Access complete set of age- and/or gender-specific reference  intervals for this test in the [facility] Test Directory  ([email or web address]).  Performed by [facility],  [address] [place] [id number] [phone]  [email or web address], Sesar Murray MD - Lab. Director     • vWF Antigen 11/27/2018 123 "  52 - 214 % Final    Comment: REFERENCE INTERVAL: von Willebrand Factor, Antigen  Access complete set of age- and/or gender-specific reference  intervals for this test in the [facility] Laboratory Test Directory  ([email or web address]).     • VWF Activity 11/27/2018 133  51 - 215 % Final    Comment: REFERENCE INTERVAL: von Willebrand Factor, Activity (RCF)  Access complete set of age- and/or gender-specific reference  intervals for this test in the [facility] Laboratory Test Directory  ([email or web address]).     • Platelet Function Epi 11/27/2018 157.0  83.0 - 170.0 sec Final   • Medications - Platelet Functio 11/27/2018 None   Final    Comment: Whole blood samples are initially screened with collagen/epi-  nephrine to detect platelet dysfunction due to plt defects,  von Willebrand's disease or exposure to platelet inhibiting  agents. If the COL/EPI is elevated, the sample is then tested  with collagen/ADP. If both results are elevated, consideration  should be given to qualitative plt defects, von Willebrand's,  drugs affecting plt function, severe anemia or thrombocyto-  penia. If the COL/EPI is elevated and the COL/ADP is normal,  consideration should be given to aspirin induced platelet  defects, other drugs, low hct, mild thrombocytopenia, mild  von Willebrand's or mild qualitative plt defects.     • Factor VII 11/27/2018 55* 80 - 181 % Final    Comment: REFERENCE INTERVAL: Factor VII, Activity  Access complete set of age- and/or gender-specific reference  intervals for this test in the [facility] Test Directory  ([email or web address]).  Performed by [facility],  [address] [place] [address],[place] [address] [phone]  [email or web address], Sesar Murray MD - Lab. Director     • PT 11/27/2018 15.4* 12.0 - 14.6 sec Final   • INR 11/27/2018 1.21* 0.87 - 1.13 Final    Comment: INR - Non-therapeutic Reference Range: 0.87-1.13  INR - Therapeutic Reference Range: 2.0-4.0     • Normal Protime 11/27/2018 13.2  12.0 - 14.6 sec Final   • Pt Mixing Study  11/27/2018 13.8  12.0 - 14.6 Final     CBC WITH DIFFERENTIAL (Order #[id number]) on 11/17/18  WBC 5.3  4.8 - 10.8 K/uL Final   RBC 4.58  4.20 - 5.40 M/uL Final   Hemoglobin 13.3  12.0 - 16.0 g/dL Final   Hematocrit 41.0  37.0 - 47.0 % Final   MCV 89.5  81.4 - 97.8 fL Final   MCH 29.0  27.0 - 33.0 pg Final   MCHC 32.4   33.6 - 35.0 g/dL Final   RDW 44.9  35.9 - 50.0 fL Final   Platelet Count 209  164 - 446 K/uL Final     PLATELET FUNCTION (Order #[id number]) on 11/27/18  Platelet Function Epi 157.0  83.0 - 170.0 sec Final     VON WILLEBRAND'S PROFILE (Order #[id number]) on 11/27/18  Factor VIII Activity 117  56 - 191 % Final   Comment:      vWF Antigen 123  52 - 214 % Final     PT MIXING STUDY (Order #[id number]) on 11/27/18  PT 15.4   12.0 - 14.6 sec Final   INR 1.21   0.87 - 1.13 Final   Comment:   INR - Non-therapeutic Reference Range: 0.87-1.13   INR - Therapeutic Reference Range: 2.0-4.0    Normal Protime 13.2  12.0 - 14.6 sec Final   Pt Mixing Study 13.8  12.0 - 14.6 Final     FACTOR VII (Order #[id number]) on 11/27/18  Factor VII 55   80 - 181 % Final       Assessment and Plan:  Antoinette Guillen  is a 28 y.o. year old woman who was recently seen for initial consultation to discuss an episode of excessive bruising involving her lower extremities with no prior history of excessive bleeding or bruising including ulnar surgery at age 21. Patient brought pictures on her phone at the time of initial appointment showing extensive large bruises on both legs proximally and distally with no history of reported significant trauma.     Labs done since last visit show slightly prolonged PT, normal PTT with normal vWD profile (multimer analysis not done) and normal platelet function. Factor VII was slightly low with normal mixing study suggesting factor deficiency rather than presece of an inhibitor.     Causes of low factor VII were discussed today and include mild vitamin K deficiency which may impact factor VII before it  affects other factors, mild liver disease or factor deficiency due to an inherited disorder. Inherited factor VII is extremely rare. Liver disease is unlikely given absence of alcohol abuse and normal LFTs. We discussed vitamin K deficiency as the potential cause, which is generally rare in adults. Some conditions which can also be associated with weigh loss may predispose to vitamin K deficiency including malabsorptive conditions such as celiac disease. Given recent 15 pound unintentional belle loss, patient may benefit from further GI evaluation, however it is interesting that she has not had any abdominal complaints. I suggested vitamin K supplements and increasing kcal intake for now. She will return to clinic in 2 -3 months for reevaluation.      She agreed and verbalized  agreement and understanding with the current plan.  I answered all questions and concerns at this time.      SIGNATURES:  Mike Serra    CC:  Pcp Pt States None  No ref. provider found

## 2019-02-13 ENCOUNTER — HOSPITAL ENCOUNTER (OUTPATIENT)
Dept: LAB | Facility: MEDICAL CENTER | Age: 29
End: 2019-02-13
Attending: INTERNAL MEDICINE
Payer: COMMERCIAL

## 2019-02-13 ENCOUNTER — OFFICE VISIT (OUTPATIENT)
Dept: HEMATOLOGY ONCOLOGY | Facility: MEDICAL CENTER | Age: 29
End: 2019-02-13
Payer: COMMERCIAL

## 2019-02-13 VITALS
HEART RATE: 70 BPM | SYSTOLIC BLOOD PRESSURE: 110 MMHG | TEMPERATURE: 98.5 F | WEIGHT: 102.29 LBS | OXYGEN SATURATION: 92 % | DIASTOLIC BLOOD PRESSURE: 56 MMHG | RESPIRATION RATE: 16 BRPM | HEIGHT: 63 IN | BODY MASS INDEX: 18.12 KG/M2

## 2019-02-13 DIAGNOSIS — R23.3 EASY BRUISING: ICD-10-CM

## 2019-02-13 DIAGNOSIS — R63.4 WEIGHT LOSS: ICD-10-CM

## 2019-02-13 LAB
APTT PPP: 28.8 SEC (ref 24.7–36)
BASOPHILS # BLD AUTO: 0.5 % (ref 0–1.8)
BASOPHILS # BLD: 0.03 K/UL (ref 0–0.12)
EOSINOPHIL # BLD AUTO: 0.06 K/UL (ref 0–0.51)
EOSINOPHIL NFR BLD: 1.1 % (ref 0–6.9)
ERYTHROCYTE [DISTWIDTH] IN BLOOD BY AUTOMATED COUNT: 45.6 FL (ref 35.9–50)
ERYTHROCYTE [SEDIMENTATION RATE] IN BLOOD BY WESTERGREN METHOD: 9 MM/HOUR (ref 0–20)
HCT VFR BLD AUTO: 45.5 % (ref 37–47)
HGB BLD-MCNC: 14.5 G/DL (ref 12–16)
IMM GRANULOCYTES # BLD AUTO: 0.05 K/UL (ref 0–0.11)
IMM GRANULOCYTES NFR BLD AUTO: 0.9 % (ref 0–0.9)
INR PPP: 1.09 (ref 0.87–1.13)
LYMPHOCYTES # BLD AUTO: 1.4 K/UL (ref 1–4.8)
LYMPHOCYTES NFR BLD: 24.9 % (ref 22–41)
MCH RBC QN AUTO: 29.1 PG (ref 27–33)
MCHC RBC AUTO-ENTMCNC: 31.9 G/DL (ref 33.6–35)
MCV RBC AUTO: 91.4 FL (ref 81.4–97.8)
MONOCYTES # BLD AUTO: 0.37 K/UL (ref 0–0.85)
MONOCYTES NFR BLD AUTO: 6.6 % (ref 0–13.4)
NEUTROPHILS # BLD AUTO: 3.72 K/UL (ref 2–7.15)
NEUTROPHILS NFR BLD: 66 % (ref 44–72)
NRBC # BLD AUTO: 0 K/UL
NRBC BLD-RTO: 0 /100 WBC
PLATELET # BLD AUTO: 223 K/UL (ref 164–446)
PMV BLD AUTO: 10.2 FL (ref 9–12.9)
PROTHROMBIN TIME: 14.2 SEC (ref 12–14.6)
RBC # BLD AUTO: 4.98 M/UL (ref 4.2–5.4)
WBC # BLD AUTO: 5.6 K/UL (ref 4.8–10.8)

## 2019-02-13 PROCEDURE — 87389 HIV-1 AG W/HIV-1&-2 AB AG IA: CPT

## 2019-02-13 PROCEDURE — 80074 ACUTE HEPATITIS PANEL: CPT

## 2019-02-13 PROCEDURE — 86038 ANTINUCLEAR ANTIBODIES: CPT

## 2019-02-13 PROCEDURE — 36415 COLL VENOUS BLD VENIPUNCTURE: CPT

## 2019-02-13 PROCEDURE — 85652 RBC SED RATE AUTOMATED: CPT

## 2019-02-13 PROCEDURE — 99214 OFFICE O/P EST MOD 30 MIN: CPT | Performed by: INTERNAL MEDICINE

## 2019-02-13 PROCEDURE — 85730 THROMBOPLASTIN TIME PARTIAL: CPT

## 2019-02-13 PROCEDURE — 85025 COMPLETE CBC W/AUTO DIFF WBC: CPT

## 2019-02-13 PROCEDURE — 85610 PROTHROMBIN TIME: CPT

## 2019-02-13 ASSESSMENT — PAIN SCALES - GENERAL: PAINLEVEL: NO PAIN

## 2019-02-13 NOTE — PROGRESS NOTES
Date of visit: 2/13/2019  2:25 PM      Chief Complaint-  Excessive bruising.     Identification/Prior relevant history:   Seen by kenyetta Moon before  Antoinette Guillen is a 28 year old woman who recently presented at urgent care for evaluation of excessive upper and lower extremity bruising. She reports no trauma or excessive physical activity preceding the day she woke up to find multiple large bruises covering her entire legs and less so on the arms. She also reports around 15 pound unintentional weight loss over the past year. She states food is not as appetizing to her and she may have skipped meals. Denies alcohol or drug use. Denies history of liver issues. Denies use of coumarin like products or derivatives including warfarin.   slightly prolonged PT, normal PTT with normal vWD profile (multimer analysis not done) and normal platelet function. Factor VII was slightly low with normal mixing study suggesting factor deficiency rather than presece of an inhibitor    Interim history-she was started on vitamin K tablet by Dr. Serra     she continues to have intermittent bruising issues along with nonspecific weight loss without major abdominal symptoms.  No menorrhagia as she had implantable IUD.    Past Medical History:    No past medical history on file.    Past surgical history:     No past surgical history on file.    Allergies:       Patient has no known allergies.    Medications:         Current Outpatient Prescriptions   Medication Sig Dispense Refill   • Etonogestrel (NEXPLANON SC) Inject  as instructed.       No current facility-administered medications for this visit.          Social History:     Social History     Social History   • Marital status: Single     Spouse name: N/A   • Number of children: N/A   • Years of education: N/A     Occupational History   • Not on file.     Social History Main Topics   • Smoking status: Unknown If Ever Smoked   • Smokeless tobacco: Never Used   • Alcohol use No   •  "Drug use: Yes     Types: Marijuana      Comment: occ   • Sexual activity: Not on file     Other Topics Concern   • Not on file     Social History Narrative   • No narrative on file       Family History:    No family history on file.    Review of Systems:  All other review of systems are negative except what was mentioned above in the HPI.    Constitutional: Negative for fever, chills, weight loss and malaise/fatigue.    HEENT: No new auditory or visual complaints. No sore throat and neck pain.     Respiratory: Negative for cough, sputum production, shortness of breath and wheezing.    Cardiovascular: Negative for chest pain, palpitations, orthopnea and leg swelling.    Gastrointestinal: Negative for heartburn, nausea, vomiting and abdominal pain.    Genitourinary: Negative for dysuria, hematuria    Musculoskeletal: No new arthralgias or myalgias   Skin: Negative for rash and itching.    Neurological: Negative for focal weakness and headaches.    Endo/Heme/Allergies: No abnormal bleed/bruise.    Psychiatric/Behavioral: No new depression/anxiety.    Physical Exam:  Vitals: /56 (BP Location: Right arm, Patient Position: Sitting, BP Cuff Size: Adult)   Pulse 70   Temp 36.9 °C (98.5 °F) (Temporal)   Resp 16   Ht 1.6 m (5' 3\")   Wt 46.4 kg (102 lb 4.7 oz)   SpO2 92%   BMI 18.12 kg/m²     General: Not in acute distress, alert and oriented x 3  HEENT: No pallor, icterus. Oropharynx clear.   Neck: Supple, no palpable masses.  Lymph nodes: No palpable cervical, supraclavicular, axillary or inguinal lymphadenopathy.    CVS: regular rate and rhythm, no rubs or gallops  RESP: Clear to auscultate bilaterally, no wheezing or crackles.   ABD: Soft, non tender, non distended, positive bowel sounds, no palpable organomegaly  EXT: No edema or cyanosis  CNS: Alert and oriented x3, No focal deficits.  Skin- No rash      Labs:   No visits with results within 1 Week(s) from this visit.   Latest known visit with results is: "   Hospital Outpatient Visit on 11/27/2018   Component Date Value Ref Range Status   • Factor VIII Activity 11/27/2018 117  56 - 191 % Final    Comment: REFERENCE INTERVAL: Factor VIII, Activity  Access complete set of age- and/or gender-specific reference  intervals for this test in the [facility] Laboratory Test Directory  ([email or web address].com).  Performed by [facility],  [address], [facility] [place] [address] [phone]  [email or web address], Sesar Murray MD - Lab. Director     • vWF Antigen 11/27/2018 123  52 - 214 % Final    Comment: REFERENCE INTERVAL: von Willebrand Factor, Antigen  Access complete set of age- and/or gender-specific reference  intervals for this test in the [facility] Laboratory Test Directory  ([email or web address]).     • VWF Activity 11/27/2018 133  51 - 215 % Final    Comment: REFERENCE INTERVAL: von Willebrand Factor, Activity (RCF)  Access complete set of age- and/or gender-specific reference  intervals for this test in the [facility] Laboratory Test Directory  ([email or web address]).     • Platelet Function Epi 11/27/2018 157.0  83.0 - 170.0 sec Final   • Medications - Platelet Functio 11/27/2018 None   Final    Comment: Whole blood samples are initially screened with collagen/epi-  nephrine to detect platelet dysfunction due to plt defects,  von Willebrand's disease or exposure to platelet inhibiting  agents. If the COL/EPI is elevated, the sample is then tested  with collagen/ADP. If both results are elevated, consideration  should be given to qualitative plt defects, von Willebrand's,  drugs affecting plt function, severe anemia or thrombocyto-  penia. If the COL/EPI is elevated and the COL/ADP is normal,  consideration should be given to aspirin induced platelet  defects, other drugs, low hct, mild thrombocytopenia, mild  von Willebrand's or mild qualitative plt defects.     • Factor VII 11/27/2018 55* 80 - 181 % Final    Comment: REFERENCE INTERVAL: Factor VII, Activity  Access complete set of age- and/or gender-specific  reference  intervals for this test in the [facility] Laboratory Test Directory  ([email or web address]).  Performed by [facility],  [address] [place] [address] [phone]  [email or web address], Sesar Murray MD - Lab. 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  Platelet functions were adequate.  No obvious aspirin, NSAIDs or antidepressant use.  No obvious von Willebrand disease on screening she has been on vitamin K supplement.  Etiology is unknown.  Follow-up prothrombin time has shown normalization.  She will continue occasional vitamin K supplementation.  It is unclear whether she has any malabsorption symptoms.  Instructed her to follow-up with the GI.  She continues to have nonspecific weight loss.  I will obtain a CT of her chest abdomen pelvis given her unexplained weight loss and able symptoms.  Further testing ordered today including CARLOS, hepatitis and HIV screen subsequently came back negative.  Return to clinic in 3 months  She agreed and verbalized  agreement and understanding with the current plan.  I answered all questions and concerns at this time         Please note that this dictation was created using voice recognition software. I have made every reasonable attempt to correct obvious errors, but I expect that there are errors of grammar and possibly content that I did not discover before finalizing the note.      SIGNATURES:  Angel Cyr    CC:  Pcp Pt States None  No ref. provider found

## 2019-02-14 LAB
HAV IGM SERPL QL IA: NEGATIVE
HBV CORE IGM SER QL: NEGATIVE
HBV SURFACE AG SER QL: NEGATIVE
HCV AB SER QL: NEGATIVE
HIV 1+2 AB+HIV1 P24 AG SERPL QL IA: NON REACTIVE

## 2019-02-15 LAB — NUCLEAR IGG SER QL IA: NORMAL

## 2019-02-19 ENCOUNTER — TELEPHONE (OUTPATIENT)
Dept: HEMATOLOGY ONCOLOGY | Facility: MEDICAL CENTER | Age: 29
End: 2019-02-19

## 2019-02-19 NOTE — TELEPHONE ENCOUNTER
Peer to peer is required for the CT scan that is scheduled for tomorrow. Per Marianne at [facility].     Peer to peer phone #: [phone]  Follow the prompts.     Reference #: [id number]    Please obtain authorization for all three scans: chest, abdomen and pelvis.     I transferred the call to Dr. Cyr.

## 2019-02-20 ENCOUNTER — HOSPITAL ENCOUNTER (OUTPATIENT)
Dept: RADIOLOGY | Facility: MEDICAL CENTER | Age: 29
End: 2019-02-20
Attending: INTERNAL MEDICINE
Payer: COMMERCIAL

## 2019-02-20 ENCOUNTER — TELEPHONE (OUTPATIENT)
Dept: HEMATOLOGY ONCOLOGY | Facility: MEDICAL CENTER | Age: 29
End: 2019-02-20

## 2019-02-20 DIAGNOSIS — R16.1 SPLEEN ENLARGED: ICD-10-CM

## 2019-02-20 DIAGNOSIS — R63.4 WEIGHT LOSS: ICD-10-CM

## 2019-02-20 DIAGNOSIS — R23.3 EASY BRUISING: ICD-10-CM

## 2019-02-20 PROCEDURE — 74177 CT ABD & PELVIS W/CONTRAST: CPT

## 2019-02-20 PROCEDURE — 700117 HCHG RX CONTRAST REV CODE 255: Performed by: INTERNAL MEDICINE

## 2019-02-20 RX ADMIN — IOHEXOL 50 ML: 240 INJECTION, SOLUTION INTRATHECAL; INTRAVASCULAR; INTRAVENOUS; ORAL at 16:21

## 2019-02-20 RX ADMIN — IOHEXOL 80 ML: 350 INJECTION, SOLUTION INTRAVENOUS at 16:21

## 2019-02-21 NOTE — TELEPHONE ENCOUNTER
RN Called patient and let the patient know Dr. Cyr has reviewed the CToverall looks okay.  Spleen is mildly enlarged which does not appear to be anything concerning.  Patient will need a chest xray as well as a U/S of the spleen just prior to a 6 month f/u with Dr. Cyr. RN instructed the patient that an MA would call her tomorrow to help set up scheduling for he imaging as well as the f/u appointment. Patient verbalized understanding at this time.

## 2019-02-23 ENCOUNTER — HOSPITAL ENCOUNTER (OUTPATIENT)
Dept: RADIOLOGY | Facility: MEDICAL CENTER | Age: 29
End: 2019-02-23
Attending: INTERNAL MEDICINE
Payer: COMMERCIAL

## 2019-02-23 DIAGNOSIS — R63.4 WEIGHT LOSS: ICD-10-CM

## 2019-02-23 DIAGNOSIS — R16.1 SPLEEN ENLARGED: ICD-10-CM

## 2019-02-23 PROCEDURE — 71046 X-RAY EXAM CHEST 2 VIEWS: CPT

## 2019-05-08 ENCOUNTER — OFFICE VISIT (OUTPATIENT)
Dept: URGENT CARE | Facility: PHYSICIAN GROUP | Age: 29
End: 2019-05-08
Payer: COMMERCIAL

## 2019-05-08 VITALS
TEMPERATURE: 98.2 F | BODY MASS INDEX: 18.07 KG/M2 | SYSTOLIC BLOOD PRESSURE: 100 MMHG | HEART RATE: 66 BPM | HEIGHT: 63 IN | DIASTOLIC BLOOD PRESSURE: 68 MMHG | WEIGHT: 102 LBS | OXYGEN SATURATION: 96 %

## 2019-05-08 DIAGNOSIS — R10.12 LUQ PAIN: ICD-10-CM

## 2019-05-08 DIAGNOSIS — R11.0 NAUSEA: ICD-10-CM

## 2019-05-08 DIAGNOSIS — Z87.898 HISTORY OF SPLENOMEGALY: ICD-10-CM

## 2019-05-08 PROCEDURE — 99213 OFFICE O/P EST LOW 20 MIN: CPT | Performed by: PHYSICIAN ASSISTANT

## 2019-05-08 RX ORDER — ONDANSETRON 4 MG/1
4 TABLET, FILM COATED ORAL EVERY 6 HOURS PRN
Qty: 12 TAB | Refills: 0 | Status: SHIPPED | OUTPATIENT
Start: 2019-05-08 | End: 2019-05-11

## 2019-05-08 ASSESSMENT — ENCOUNTER SYMPTOMS
MYALGIAS: 0
DIARRHEA: 0
HEADACHES: 0
CHILLS: 0
ARTHRALGIAS: 0
FLANK PAIN: 0
HEMATOCHEZIA: 0
SHORTNESS OF BREATH: 0
NAUSEA: 1
FLATUS: 0
BLOOD IN STOOL: 0
CONSTIPATION: 0
ABDOMINAL PAIN: 1
VOMITING: 0
FEVER: 0
ANOREXIA: 0
BELCHING: 1

## 2019-05-08 NOTE — PROGRESS NOTES
Subjective:   Antoinette Guillen is a 29 y.o. female who presents for Abdominal Pain (cramping, x 1 day)       Patient presents today with LUQ abdominal pain and nausea. She states that she had CT scan in 2/2019 that showed her spleen was enlarged. She has a follow up ultrasound scheduled for tomorrow.       LUQ Pain   This is a new problem. The current episode started yesterday. The onset quality is undetermined. The problem occurs constantly. The problem has been unchanged. The pain is located in the LUQ. The pain is moderate. The quality of the pain is aching and a sensation of fullness. The abdominal pain does not radiate. Associated symptoms include belching and nausea. Pertinent negatives include no anorexia, arthralgias, constipation, diarrhea, dysuria, fever, flatus, frequency, headaches, hematochezia, hematuria, melena, myalgias or vomiting. The pain is aggravated by certain positions and palpation. The pain is relieved by nothing. She has tried nothing for the symptoms. The treatment provided no relief. Prior diagnostic workup includes CT scan (mild splenomegaly).     Review of Systems   Constitutional: Negative for chills and fever.   Respiratory: Negative for shortness of breath.    Gastrointestinal: Positive for abdominal pain and nausea. Negative for anorexia, blood in stool, constipation, diarrhea, flatus, hematochezia, melena and vomiting.   Genitourinary: Negative for dysuria, flank pain, frequency, hematuria and urgency.   Musculoskeletal: Negative for arthralgias and myalgias.   Neurological: Negative for headaches.   All other systems reviewed and are negative.      PMH:  has no past medical history on file.    MEDS:   Current Outpatient Prescriptions:   •  ondansetron (ZOFRAN) 4 MG Tab tablet, Take 1 Tab by mouth every 6 hours as needed for Nausea/Vomiting for up to 3 days., Disp: 12 Tab, Rfl: 0  •  Etonogestrel (NEXPLANON SC), Inject  as instructed., Disp: , Rfl:     ALLERGIES: No Known  "Allergies    SURGHX: History reviewed. No pertinent surgical history.    SOCHX:  has an unknown smoking status. She has never used smokeless tobacco. She reports that she uses drugs, including Marijuana. She reports that she does not drink alcohol.    FH: Reviewed with patient, not pertinent to this visit.     Objective:   /68   Pulse 66   Temp 36.8 °C (98.2 °F) (Temporal)   Ht 1.6 m (5' 3\")   Wt 46.3 kg (102 lb)   SpO2 96%   BMI 18.07 kg/m²   Physical Exam   Constitutional: She is oriented to person, place, and time. She appears well-developed and well-nourished. No distress.   HENT:   Head: Normocephalic and atraumatic.   Nose: Nose normal.   Eyes: Conjunctivae and EOM are normal.   Neck: Normal range of motion. No tracheal deviation present.   Pulmonary/Chest: Effort normal. No respiratory distress.   Abdominal: Soft. Normal appearance and bowel sounds are normal. She exhibits no distension and no mass. There is tenderness in the left upper quadrant. There is no rigidity, no rebound, no guarding and no CVA tenderness.   Musculoskeletal:   ROM normal all four extremities   Neurological: She is alert and oriented to person, place, and time.   Skin: Skin is warm and dry.   Psychiatric: She has a normal mood and affect. Her behavior is normal. Judgment and thought content normal.   Vitals reviewed.      Assessment/Plan:   1. Nausea  - ondansetron (ZOFRAN) 4 MG Tab tablet; Take 1 Tab by mouth every 6 hours as needed for Nausea/Vomiting for up to 3 days.  Dispense: 12 Tab; Refill: 0    2. LUQ pain    3. History of splenomegaly    - Offered splenic US today, patient declines and states she will attend scheduled appointment tomorrow  - Advised to try BRAT diet, advancing as tolerated  - Strict ER precautions given   - Advised to follow up with Oncologist    Differential diagnosis, natural history, supportive care, and indications for immediate follow-up discussed.  "

## 2019-05-09 ENCOUNTER — HOSPITAL ENCOUNTER (OUTPATIENT)
Dept: RADIOLOGY | Facility: MEDICAL CENTER | Age: 29
End: 2019-05-09
Attending: INTERNAL MEDICINE
Payer: COMMERCIAL

## 2019-05-09 DIAGNOSIS — R63.4 WEIGHT LOSS: ICD-10-CM

## 2019-05-09 DIAGNOSIS — R16.1 SPLEEN ENLARGED: ICD-10-CM

## 2019-05-09 PROCEDURE — 76705 ECHO EXAM OF ABDOMEN: CPT

## 2019-05-15 ENCOUNTER — OFFICE VISIT (OUTPATIENT)
Dept: HEMATOLOGY ONCOLOGY | Facility: MEDICAL CENTER | Age: 29
End: 2019-05-15
Payer: COMMERCIAL

## 2019-05-15 VITALS
RESPIRATION RATE: 16 BRPM | BODY MASS INDEX: 17.47 KG/M2 | TEMPERATURE: 97.9 F | DIASTOLIC BLOOD PRESSURE: 62 MMHG | WEIGHT: 104.83 LBS | HEART RATE: 76 BPM | OXYGEN SATURATION: 97 % | SYSTOLIC BLOOD PRESSURE: 102 MMHG | HEIGHT: 65 IN

## 2019-05-15 DIAGNOSIS — R23.3 EASY BRUISING: ICD-10-CM

## 2019-05-15 DIAGNOSIS — D68.2 FACTOR VII DEFICIENCY (HCC): ICD-10-CM

## 2019-05-15 DIAGNOSIS — Z87.42 HISTORY OF MENORRHAGIA: ICD-10-CM

## 2019-05-15 DIAGNOSIS — R16.1 SPLENOMEGALY: ICD-10-CM

## 2019-05-15 DIAGNOSIS — R63.4 WEIGHT LOSS: ICD-10-CM

## 2019-05-15 DIAGNOSIS — R53.83 FATIGUE, UNSPECIFIED TYPE: ICD-10-CM

## 2019-05-15 DIAGNOSIS — R79.1 ABNORMAL BLEEDING TIME: ICD-10-CM

## 2019-05-15 PROCEDURE — 99213 OFFICE O/P EST LOW 20 MIN: CPT | Performed by: INTERNAL MEDICINE

## 2019-05-15 ASSESSMENT — PAIN SCALES - GENERAL: PAINLEVEL: 3=SLIGHT PAIN

## 2019-05-16 PROBLEM — R53.83 FATIGUE: Status: ACTIVE | Noted: 2019-05-15

## 2019-05-16 NOTE — PROGRESS NOTES
Date of visit: 5/15/2019  2:27 PM      Chief Complaint- Excessive bruising and bleeding      Identification/Prior relevant history: Antoinette Guillen  is a 29 y.o. year old female who is here for follow-up of excessive spontaneous bruising of the upper and lower extremities as well as excessive bleeding.  Patient reported waking up with excessive bruising of both her upper lower extremities on November 16, 2018.  She has not had a period for 2 years since being on Norplant but does report history of menorrhagia going through a box of tampons for her periods, which lasted 5 to 6 days.  She also reports excessive bleeding from a tooth extraction approximately 1.5 years ago.  Patient denied taking any antiplatelet or anticoagulation medications.  Her PT was slightly prolonged.  PTT and von Willebrand disease profile (multiple neuro analysis was not done) as well as platelet functions were normal.  Factor VII was noted to be slightly low (55) and normal mixing study suggested a factor deficiency rather than presence of an inhibitor.  Patient also reported on her previous visits a 15 pound unintentional weight loss over the past year.  Patient was started on vitamin K supplementation by Dr. Serra.    Interim history  -Patient now taking vitamin K.  -INR normalized from 1.21 to 1.09.  -2/20/19 CT scan abdomen and pelvis:  1. Mild splenomegaly.  2. No enlarged lymph nodes.  3. No acute inflammatory change in the abdomen or pelvis.  -HIV, hepatitis panel, CARLOS negative.  -No further episodes of spontaneous bruising or significant bleeding.  -She does report breakthrough bleeding/spotting on Norplant recently, which has been mild.  -Has not seen GI yet.  -No more significant weight loss although continuing to have decreased appetite.  -Recently fatigued and having left upper quadrant pain.      Past Medical History:    No past medical history on file.    Past surgical history:     No past surgical history on  "file.    Allergies:       Patient has no known allergies.    Medications:         Current Outpatient Prescriptions   Medication Sig Dispense Refill   • Etonogestrel (NEXPLANON SC) Inject  as instructed.       No current facility-administered medications for this visit.          Social History:     Social History     Social History   • Marital status: Single     Spouse name: N/A   • Number of children: N/A   • Years of education: N/A     Occupational History   • Not on file.     Social History Main Topics   • Smoking status: Unknown If Ever Smoked   • Smokeless tobacco: Never Used   • Alcohol use No   • Drug use: Yes     Types: Marijuana      Comment: occ   • Sexual activity: Not on file     Other Topics Concern   • Not on file     Social History Narrative   • No narrative on file       Family History:    No family history on file.    Review of Systems:  All other review of systems are negative except what was mentioned above in the HPI.    Constitutional: Negative for fever, chills.  Positive for weight loss and fatigue.  HEENT: No new auditory or visual complaints. No sore throat and neck pain.     Respiratory: Negative for cough, sputum production, shortness of breath and wheezing.    Cardiovascular: Negative for chest pain, palpitations, orthopnea and leg swelling.    Gastrointestinal: Negative for heartburn, nausea, vomiting and abdominal pain.    Genitourinary: Negative for dysuria, hematuria    Musculoskeletal: No new arthralgias or myalgias   Skin: Negative for rash and itching.    Neurological: Negative for focal weakness and headaches.    Endo/Heme/Allergies: No new abnormal bleed/bruise.    Psychiatric/Behavioral: No new depression/anxiety.    Physical Exam:  Vitals: /62 (BP Location: Left arm, Patient Position: Sitting, BP Cuff Size: Adult)   Pulse 76   Temp 36.6 °C (97.9 °F) (Temporal)   Resp 16   Ht 1.651 m (5' 5\")   Wt 47.6 kg (104 lb 13.3 oz)   SpO2 97%   BMI 17.44 kg/m²     General: Not " in acute distress, alert and oriented x 3  HEENT: No pallor, icterus. Oropharynx clear.   Neck: Supple, no palpable masses.  Lymph nodes: No palpable cervical, supraclavicular, axillary or inguinal lymphadenopathy.    CVS: regular rate and rhythm, no rubs or gallops  RESP: Clear to auscultate bilaterally, no wheezing or crackles.   ABD: Soft, non distended, positive bowel sounds, mild left upper quadrant tenderness with palpable spleen.  EXT: No edema or cyanosis  CNS: Alert and oriented x3, No focal deficits.  Skin- No rash      Labs:   No visits with results within 1 Week(s) from this visit.   Latest known visit with results is:   Hospital Outpatient Visit on 02/13/2019   Component Date Value Ref Range Status   • WBC 02/13/2019 5.6  4.8 - 10.8 K/uL Final   • RBC 02/13/2019 4.98  4.20 - 5.40 M/uL Final   • Hemoglobin 02/13/2019 14.5  12.0 - 16.0 g/dL Final   • Hematocrit 02/13/2019 45.5  37.0 - 47.0 % Final   • MCV 02/13/2019 91.4  81.4 - 97.8 fL Final   • MCH 02/13/2019 29.1  27.0 - 33.0 pg Final   • MCHC 02/13/2019 31.9* 33.6 - 35.0 g/dL Final   • RDW 02/13/2019 45.6  35.9 - 50.0 fL Final   • Platelet Count 02/13/2019 223  164 - 446 K/uL Final   • MPV 02/13/2019 10.2  9.0 - 12.9 fL Final   • Neutrophils-Polys 02/13/2019 66.00  44.00 - 72.00 % Final   • Lymphocytes 02/13/2019 24.90  22.00 - 41.00 % Final   • Monocytes 02/13/2019 6.60  0.00 - 13.40 % Final   • Eosinophils 02/13/2019 1.10  0.00 - 6.90 % Final   • Basophils 02/13/2019 0.50  0.00 - 1.80 % Final   • Immature Granulocytes 02/13/2019 0.90  0.00 - 0.90 % Final   • Nucleated RBC 02/13/2019 0.00  /100 WBC Final   • Neutrophils (Absolute) 02/13/2019 3.72  2.00 - 7.15 K/uL Final    Includes immature neutrophils, if present.   • Lymphs (Absolute) 02/13/2019 1.40  1.00 - 4.80 K/uL Final   • Monos (Absolute) 02/13/2019 0.37  0.00 - 0.85 K/uL Final   • Eos (Absolute) 02/13/2019 0.06  0.00 - 0.51 K/uL Final   • Baso (Absolute) 02/13/2019 0.03  0.00 - 0.12 K/uL  Final   • Immature Granulocytes (abs) 02/13/2019 0.05  0.00 - 0.11 K/uL Final   • NRBC (Absolute) 02/13/2019 0.00  K/uL Final   • PT 02/13/2019 14.2  12.0 - 14.6 sec Final   • INR 02/13/2019 1.09  0.87 - 1.13 Final    Comment: INR - Non-therapeutic Reference Range: 0.87-1.13  INR - Therapeutic Reference Range: 2.0-4.0     • APTT 02/13/2019 28.8  24.7 - 36.0 sec Final    Therapeutic Heparin Range: 63-96 seconds   • Antinuclear Antibody 02/13/2019 None Detected  None Detected Final    Comment: If suspicion of connective tissue disease is strong and CARLOS EIA is  negative, consider testing for CARLOS by IFA ([id number]).  No antibodies to Anti-Nuclear Antibodies (CARLOS) detected.  The  Extractable Nuclear Antigen Antibodies (RNP, Harrison, SSA, Christina-1 and  SSB) and Double Stranded DNA (dsDNA) Antibody, IgG will not be  performed.  INTERPRETIVE INFORMATION: Anti-Nuclear Antibodies (CARLOS), IgG by  TELMA  Anti-Nuclear Antibodies (CARLOS), IgG by TELMA: CARLOS specimens are  screened using enzyme-linked immunosorbent assay (TELMA)  methodology. All TELMA results reported as Detected are further  tested by indirect fluorescent assay (IFA) using HEp-2 substrate  with an IgG-specific conjugate. The CARLOS TELMA screen is designed  to detect antibodies against dsDNA, histone, SS-A (Ro), SS-B (La),  Harrison, snRNP/Sm, Scl-70, Christina-1, centromere, and an extract of lysed  HEp-2 cells. CARLOS TELMA assays have been reported to have lower  sensitivities than CARLOS IFA for systemic autoimmune rheumatic  diseases (SARD).  N                           egative results do not necessarily rule out SARD.  Performed by [email or web address],  [address] [place] [id number] [phone]  [email or web address], Sesar Murray MD - Lab. Director     • HIV Ag/Ab Combo Assay 02/13/2019 Non Reactive  Non Reactive Final    Comment: Screen is NEGATIVE for p24 antigen and HIV 1/O/2 antibodies.  A negative screen does not preclude the possibility of exposure  or infection.  Consider retesting  in high-risk patients, if  clinically indicated.     • Hepatitis B Surface Antigen 02/13/2019 Negative  Negative Final    Comment: It has been reported that certain assays will not detect all HBV  mutants.  If acute or chronic HBV infection is suspected and the  HBsAg result is negative, it is recommended that other serological  markers be tested to confirm the HBsAg nonreactivity.  HBsAg test  results should always be assessed in conjunction with the patient's  medical history, clinical examination, and other findings.     • Hepatitis B Cors Ab,IgM 02/13/2019 Negative  Negative Final    Comment: The ADVIA Centaur HBc IgM assay is a diagnostic test for the  qualitative determination of IgM response to hepatitis B virus  core antigen in human serum.  The results from this or any  other diagnostic kit should be used and interpreted only in  the context of the overall clinical picture.  A negative test  result does not exclude the possibility of exposure to hepatitis  B virus.     • Hepatitis A Virus Ab, IgM 02/13/2019 Negative  Negative Final    Comment: The ADVIA Centaur HAV IgM assay is a diagnostic immunoassay for  the qualitative determination of IgM response to the hepatitis A  virus in human serum.  The results from this or any other  diagnostic kit should be used and interpreted only in the context  of the overall clinical picture.  A negative test result does not  exclude the possibility of exposure to hepatitis A virus.     • Hepatitis C Antibody 02/13/2019 Negative  Negative Final    Comment: The ADVIA Centaur HCV assay is limited to the detection of IgG  antibodies to hepatitis C virus in human serum.  The results  from this or any other diagnostic kit should be used and interpreted  only in the context of the overall clinical picture.  A negative  test result does not exclude the possibility of exposure to  hepatitis C virus.     • Sed Rate Westergren 02/13/2019 9  0 - 20 mm/hour Final              Assessment and Plan:  1. Abnormal bleeding time  Prothrombin Time    VITAMIN K    FACTOR VII    REFERRAL TO GASTROENTEROLOGY   2. History of menorrhagia     3. Easy bruising     4. Weight loss     5. Factor VII deficiency (HCC)     6. Splenomegaly     7. Fatigue, unspecified type       No further episodes of spontaneous bruising.  Her INR has stabilized on vitamin K replacement therapy.  Suspect mild factor VII deficiency may be secondary to vitamin K deficiency.  Etiology of vitamin K deficiency is unclear at this time but could be secondary to bacterial overgrowth the small bowel.  She does have a history of excessive bleeding with menorrhagia using up to a box of tampons per menstruation.  She has not any periods for 2 years while being on Norplant but is now experiencing some breakthrough bleeding, which appears to be mild.  Also has history of excessive gum bleeding from dental work 1-1/2 years ago.  Weight loss appears to have stabilized.  CT scan did show some mild splenomegaly.  In conjunction with her recent fatigue, suspect she may have had mononucleosis.  She was recently evaluated at a urgent care center.    Plan:  -Recheck factor VII level and PT/INR.  -Continue vitamin K supplementation.  -Referral to gastroenterology to consider work-up for cause of vitamin K deficiency including possibility of small bowel bacterial overgrowth.  -Return to clinic as needed.      Patient was seen and discussed with Dr. Cyr.        She agreed and verbalized  agreement and understanding with the current plan.  I answered all questions and concerns at this time         Please note that this dictation was created using voice recognition software. I have made every reasonable attempt to correct obvious errors, but I expect that there are errors of grammar and possibly content that I did not discover before finalizing the note.      SIGNATURES:  Gaston Galvez    CC:  Pcp Pt States None  No ref. provider  found

## 2019-05-19 PROBLEM — R16.1 SPLENOMEGALY: Status: RESOLVED | Noted: 2019-05-15 | Resolved: 2019-05-19

## 2019-05-20 ENCOUNTER — HOSPITAL ENCOUNTER (OUTPATIENT)
Dept: LAB | Facility: MEDICAL CENTER | Age: 29
End: 2019-05-20
Attending: STUDENT IN AN ORGANIZED HEALTH CARE EDUCATION/TRAINING PROGRAM
Payer: COMMERCIAL

## 2019-05-20 DIAGNOSIS — R79.1 ABNORMAL BLEEDING TIME: ICD-10-CM

## 2019-05-20 PROCEDURE — 84597 ASSAY OF VITAMIN K: CPT

## 2019-05-20 PROCEDURE — 85230 CLOT FACTOR VII PROCONVERTIN: CPT

## 2019-05-20 PROCEDURE — 85610 PROTHROMBIN TIME: CPT

## 2019-05-20 PROCEDURE — 36415 COLL VENOUS BLD VENIPUNCTURE: CPT

## 2019-05-21 LAB
INR PPP: 1.21 (ref 0.87–1.13)
PROTHROMBIN TIME: 15.4 SEC (ref 12–14.6)

## 2019-05-22 ENCOUNTER — TELEPHONE (OUTPATIENT)
Dept: HEMATOLOGY ONCOLOGY | Facility: MEDICAL CENTER | Age: 29
End: 2019-05-22

## 2019-05-22 NOTE — TELEPHONE ENCOUNTER
"Blew nose this morning with pink return, not bright red blood, throughout the day today.  Vaginal bleeding, using tampons, changing in 4 hours, becoming a little heavier than when it was when she saw Dr. Cyr on 5/15/19.  This morning she brushed her teeth, and her top front gums bled and stopped after \"a couple of seconds\"    She denies fever.  Has had N/V for a couple of months, more since she was here to see Dr. Cyr last week.  Monday she had Factor VII, and K deficiency tested.  The results for these are still pending.    I spoke with Dr. Cyr about this.  He ordered two more blood tests (APTT, Von Willebrand's Profile) for pt to get.  She also needs to get in to see a GI doctor, as a referral was placed on 5/15/19.    I phoned pt back to let her know about the lab requests.  I also reminded her about making a GI appt as soon as possible.  She verbalized understanding.  "

## 2019-05-22 NOTE — TELEPHONE ENCOUNTER
Patient called she stated that she started vaginal bleeding a couple of days and that she has not have a period for about 2-3yrs because she has birth control. She mention that it is a heavy flow and she is wondering if this is something she needs to come in to be seen for. Also she mention that when she was blowing her nose she had like light pink blood on the tissue and was wondering if that was normal too.     We may contact Antoinette at [phone].

## 2019-05-24 LAB — FACT VII ACT/NOR PPP: 63 % (ref 80–181)

## 2019-05-25 LAB — PHYTONADIONE SERPL-MCNC: 0.71 NMOL/L (ref 0.22–4.88)

## 2019-05-27 ENCOUNTER — OFFICE VISIT (OUTPATIENT)
Dept: URGENT CARE | Facility: PHYSICIAN GROUP | Age: 29
End: 2019-05-27
Payer: COMMERCIAL

## 2019-05-27 VITALS
HEART RATE: 80 BPM | DIASTOLIC BLOOD PRESSURE: 60 MMHG | SYSTOLIC BLOOD PRESSURE: 110 MMHG | HEIGHT: 65 IN | TEMPERATURE: 98.2 F | OXYGEN SATURATION: 99 % | WEIGHT: 104 LBS | BODY MASS INDEX: 17.33 KG/M2

## 2019-05-27 DIAGNOSIS — R11.2 NAUSEA AND VOMITING, INTRACTABILITY OF VOMITING NOT SPECIFIED, UNSPECIFIED VOMITING TYPE: ICD-10-CM

## 2019-05-27 PROCEDURE — 99214 OFFICE O/P EST MOD 30 MIN: CPT | Performed by: FAMILY MEDICINE

## 2019-05-27 RX ORDER — ONDANSETRON 4 MG/1
4 TABLET, ORALLY DISINTEGRATING ORAL EVERY 8 HOURS PRN
Qty: 10 TAB | Refills: 0 | Status: SHIPPED | OUTPATIENT
Start: 2019-05-27 | End: 2019-11-12

## 2019-05-27 RX ORDER — ONDANSETRON 4 MG/1
4 TABLET, ORALLY DISINTEGRATING ORAL ONCE
Status: COMPLETED | OUTPATIENT
Start: 2019-05-27 | End: 2019-05-27

## 2019-05-27 RX ADMIN — ONDANSETRON 4 MG: 4 TABLET, ORALLY DISINTEGRATING ORAL at 11:17

## 2019-05-27 NOTE — PROGRESS NOTES
"  Chief Complaint   Patient presents with   • Nausea     indigestion, diarrhea, x 1 day           Emesis  This is a new problem. The current episode started in the past 2 days.  She has had several episodes of emesis, diarrrhea.    no blood in stool or emesis.  Associated symptoms include acid reflux. Pertinent negatives include no abdominal pain, chills, coughing, fever, headaches, or weight loss. Nothing aggravates the symptoms. There are no known risk factors. Patient has tried nothing for the symptoms. There is no history of inflammatory bowel disease.     History reviewed. No pertinent past medical history.    Social History   Substance Use Topics   • Smoking status: Unknown If Ever Smoked   • Smokeless tobacco: Never Used   • Alcohol use No       Family history was reviewed and not pertinent           Review of Systems   Constitutional: Negative for fever, chills and malaise/fatigue.   Eyes: Negative for vision changes, d/c.    Respiratory: Negative for cough and sputum production.    Cardiovascular: Negative for chest pain and palpitations.   Gastrointestinal:     + for nausea, vomiting, , diarrhea .   Denies abd pain or constipation.   Genitourinary: Negative for dysuria, urgency and frequency.   Skin: Negative for rash or  itching.   Neurological: Negative for dizziness and tingling.   Psychiatric/Behavioral: Negative for depression.   Hematologic/lymphatic - denies bruising or excessive bleeding  All other systems reviewed and are negative.         Objective:     /60   Pulse 80   Temp 36.8 °C (98.2 °F) (Temporal)   Ht 1.651 m (5' 5\")   Wt 47.2 kg (104 lb)   SpO2 99%       Physical Exam   Constitutional: pt is oriented to person, place, and time and appears well-developed. No distress.   HENT:   Head: Normocephalic and atraumatic.   Mouth/Throat: No oropharyngeal exudate.   Eyes: Conjunctivae are normal. No scleral icterus.   Cardiovascular: Normal rate, regular rhythm and normal heart sounds.  "   Pulmonary/Chest: Effort normal and breath sounds normal. No respiratory distress. Pt has no wheezes. Pt has no rales.   Abdominal: Normal appearance and bowel sounds are normal. There is no splenomegaly or hepatomegaly. There is no tenderness. There is no rebound, no guarding, no CVA tenderness and no tenderness at McBurney's point.   Lymphadenopathy:     Pt has no cervical adenopathy.   Neurological: pt is alert and oriented to person, place, and time.   Skin: Skin is warm. Pt is not diaphoretic. No erythema.   Psychiatric:  behavior is normal.   Nursing note and vitals reviewed.              Assessment/Plan:         1. Viral gastroenteritis  Able to pass PO challenge after zofran   Push fluids at home   BRAT diet x 24 hr  - ondansetron (ZOFRAN) 4 MG Tab tablet; Take 1 Tab by mouth every four hours as needed for Nausea/Vomiting.  Dispense: 20 Tab; Refill: 1    F/u in 2 d if sx not improved

## 2019-05-27 NOTE — LETTER
May 27, 2019         Patient: Antoinette Guillen   YOB: 1990   Date of Visit: 5/27/2019           To Whom it May Concern:    Antoinette Guillen was seen in my clinic on 5/27/2019. She may return to work on 5/28..    If you have any questions or concerns, please don't hesitate to call.        Sincerely,           Sadiq Marcum M.D.  Electronically Signed

## 2019-05-27 NOTE — PATIENT INSTRUCTIONS
Food Choices to Help Relieve Diarrhea, Adult  When you have diarrhea, the foods you eat and your eating habits are very important. Choosing the right foods and drinks can help relieve diarrhea. Also, because diarrhea can last up to 7 days, you need to replace lost fluids and electrolytes (such as sodium, potassium, and chloride) in order to help prevent dehydration.   WHAT GENERAL GUIDELINES DO I NEED TO FOLLOW?  · Slowly drink 1 cup (8 oz) of fluid for each episode of diarrhea. If you are getting enough fluid, your urine will be clear or pale yellow.  · Eat starchy foods. Some good choices include white rice, white toast, pasta, low-fiber cereal, baked potatoes (without the skin), saltine crackers, and bagels.  · Avoid large servings of any cooked vegetables.  · Limit fruit to two servings per day. A serving is ½ cup or 1 small piece.  · Choose foods with less than 2 g of fiber per serving.  · Limit fats to less than 8 tsp (38 g) per day.  · Avoid fried foods.  · Eat foods that have probiotics in them. Probiotics can be found in certain dairy products.  · Avoid foods and beverages that may increase the speed at which food moves through the stomach and intestines (gastrointestinal tract). Things to avoid include:  ¨ High-fiber foods, such as dried fruit, raw fruits and vegetables, nuts, seeds, and whole grain foods.  ¨ Spicy foods and high-fat foods.  ¨ Foods and beverages sweetened with high-fructose corn syrup, honey, or sugar alcohols such as xylitol, sorbitol, and mannitol.  WHAT FOODS ARE RECOMMENDED?  Grains  White rice. White, Swedish, or arnaud breads (fresh or toasted), including plain rolls, buns, or bagels. White pasta. Saltine, soda, or yang crackers. Pretzels. Low-fiber cereal. Cooked cereals made with water (such as cornmeal, farina, or cream cereals). Plain muffins. Matzo. Sanjuanita toast. Zwieback.   Vegetables  Potatoes (without the skin). Strained tomato and vegetable juices. Most well-cooked and canned  vegetables without seeds. Tender lettuce.  Fruits  Cooked or canned applesauce, apricots, cherries, fruit cocktail, grapefruit, peaches, pears, or plums. Fresh bananas, apples without skin, cherries, grapes, cantaloupe, grapefruit, peaches, oranges, or plums.   Meat and Other Protein Products  Baked or boiled chicken. Eggs. Tofu. Fish. Seafood. Smooth peanut butter. Ground or well-cooked tender beef, ham, veal, lamb, pork, or poultry.   Dairy  Plain yogurt, kefir, and unsweetened liquid yogurt. Lactose-free milk, buttermilk, or soy milk. Plain hard cheese.  Beverages  Sport drinks. Clear broths. Diluted fruit juices (except prune). Regular, caffeine-free sodas such as ginger ale. Water. Decaffeinated teas. Oral rehydration solutions. Sugar-free beverages not sweetened with sugar alcohols.  Other  Bouillon, broth, or soups made from recommended foods.   The items listed above may not be a complete list of recommended foods or beverages. Contact your dietitian for more options.  WHAT FOODS ARE NOT RECOMMENDED?  Grains  Whole grain, whole wheat, bran, or rye breads, rolls, pastas, crackers, and cereals. Wild or brown rice. Cereals that contain more than 2 g of fiber per serving. Corn tortillas or taco shells. Cooked or dry oatmeal. Granola. Popcorn.  Vegetables  Raw vegetables. Cabbage, broccoli, [place] sprouts, artichokes, baked beans, beet greens, corn, kale, legumes, peas, sweet potatoes, and yams. Potato skins. Cooked spinach and cabbage.  Fruits  Dried fruit, including raisins and dates. Raw fruits. Stewed or dried prunes. Fresh apples with skin, apricots, mangoes, pears, raspberries, and strawberries.   Meat and Other Protein Products  [place] peanut butter. Nuts and seeds. Beans and lentils. [place].   Dairy  High-fat cheeses. Milk, chocolate milk, and beverages made with milk, such as milk shakes. Cream. Ice cream.  Sweets and Desserts  Sweet rolls, doughnuts, and sweet breads. Pancakes and waffles.  Fats and  Oils  Butter. Cream sauces. Margarine. Salad oils. Plain salad dressings. Olives. Avocados.   Beverages  Caffeinated beverages (such as coffee, tea, soda, or energy drinks). Alcoholic beverages. Fruit juices with pulp. Prune juice. Soft drinks sweetened with high-fructose corn syrup or sugar alcohols.  Other  Coconut. Hot sauce. Chili powder. Mayonnaise. Gravy. Cream-based or milk-based soups.   The items listed above may not be a complete list of foods and beverages to avoid. Contact your dietitian for more information.  WHAT SHOULD I DO IF I BECOME DEHYDRATED?  Diarrhea can sometimes lead to dehydration. Signs of dehydration include dark urine and dry mouth and skin. If you think you are dehydrated, you should rehydrate with an oral rehydration solution. These solutions can be purchased at pharmacies, retail stores, or online.   Drink ½-1 cup (120-240 mL) of oral rehydration solution each time you have an episode of diarrhea. If drinking this amount makes your diarrhea worse, try drinking smaller amounts more often. For example, drink 1-3 tsp (5-15 mL) every 5-10 minutes.   A general rule for staying hydrated is to drink 1½-2 L of fluid per day. Talk to your health care provider about the specific amount you should be drinking each day. Drink enough fluids to keep your urine clear or pale yellow.     This information is not intended to replace advice given to you by your health care provider. Make sure you discuss any questions you have with your health care provider.     Document Released: 03/09/2005 Document Revised: 01/08/2016 Document Reviewed: 11/10/2014  [employer] Interactive Patient Education ©2016 [employer]

## 2019-11-12 ENCOUNTER — HOSPITAL ENCOUNTER (EMERGENCY)
Facility: MEDICAL CENTER | Age: 29
End: 2019-11-12
Attending: EMERGENCY MEDICINE
Payer: COMMERCIAL

## 2019-11-12 ENCOUNTER — APPOINTMENT (OUTPATIENT)
Dept: RADIOLOGY | Facility: MEDICAL CENTER | Age: 29
End: 2019-11-12
Attending: EMERGENCY MEDICINE
Payer: COMMERCIAL

## 2019-11-12 VITALS
DIASTOLIC BLOOD PRESSURE: 66 MMHG | RESPIRATION RATE: 18 BRPM | WEIGHT: 110.89 LBS | BODY MASS INDEX: 18.45 KG/M2 | SYSTOLIC BLOOD PRESSURE: 107 MMHG | OXYGEN SATURATION: 98 % | TEMPERATURE: 97.9 F | HEART RATE: 100 BPM

## 2019-11-12 DIAGNOSIS — R07.81 PLEURITIC CHEST PAIN: ICD-10-CM

## 2019-11-12 LAB
ALBUMIN SERPL BCP-MCNC: 4.6 G/DL (ref 3.2–4.9)
ALBUMIN/GLOB SERPL: 1.7 G/DL
ALP SERPL-CCNC: 38 U/L (ref 30–99)
ALT SERPL-CCNC: 9 U/L (ref 2–50)
ANION GAP SERPL CALC-SCNC: 9 MMOL/L (ref 0–11.9)
APTT PPP: 28.4 SEC (ref 24.7–36)
AST SERPL-CCNC: 17 U/L (ref 12–45)
BASOPHILS # BLD AUTO: 0.3 % (ref 0–1.8)
BASOPHILS # BLD: 0.02 K/UL (ref 0–0.12)
BILIRUB SERPL-MCNC: 0.5 MG/DL (ref 0.1–1.5)
BUN SERPL-MCNC: 13 MG/DL (ref 8–22)
CALCIUM SERPL-MCNC: 9.2 MG/DL (ref 8.5–10.5)
CHLORIDE SERPL-SCNC: 104 MMOL/L (ref 96–112)
CO2 SERPL-SCNC: 27 MMOL/L (ref 20–33)
CREAT SERPL-MCNC: 0.74 MG/DL (ref 0.5–1.4)
EOSINOPHIL # BLD AUTO: 0.03 K/UL (ref 0–0.51)
EOSINOPHIL NFR BLD: 0.4 % (ref 0–6.9)
ERYTHROCYTE [DISTWIDTH] IN BLOOD BY AUTOMATED COUNT: 44.9 FL (ref 35.9–50)
GLOBULIN SER CALC-MCNC: 2.7 G/DL (ref 1.9–3.5)
GLUCOSE SERPL-MCNC: 91 MG/DL (ref 65–99)
HCT VFR BLD AUTO: 42.8 % (ref 37–47)
HGB BLD-MCNC: 14.1 G/DL (ref 12–16)
IMM GRANULOCYTES # BLD AUTO: 0.01 K/UL (ref 0–0.11)
IMM GRANULOCYTES NFR BLD AUTO: 0.1 % (ref 0–0.9)
INR PPP: 1.09 (ref 0.87–1.13)
LYMPHOCYTES # BLD AUTO: 1.19 K/UL (ref 1–4.8)
LYMPHOCYTES NFR BLD: 17.3 % (ref 22–41)
MCH RBC QN AUTO: 29.7 PG (ref 27–33)
MCHC RBC AUTO-ENTMCNC: 32.9 G/DL (ref 33.6–35)
MCV RBC AUTO: 90.1 FL (ref 81.4–97.8)
MONOCYTES # BLD AUTO: 0.37 K/UL (ref 0–0.85)
MONOCYTES NFR BLD AUTO: 5.4 % (ref 0–13.4)
NEUTROPHILS # BLD AUTO: 5.27 K/UL (ref 2–7.15)
NEUTROPHILS NFR BLD: 76.5 % (ref 44–72)
NRBC # BLD AUTO: 0 K/UL
NRBC BLD-RTO: 0 /100 WBC
PLATELET # BLD AUTO: 219 K/UL (ref 164–446)
PMV BLD AUTO: 9.9 FL (ref 9–12.9)
POTASSIUM SERPL-SCNC: 3.8 MMOL/L (ref 3.6–5.5)
PROT SERPL-MCNC: 7.3 G/DL (ref 6–8.2)
PROTHROMBIN TIME: 14.3 SEC (ref 12–14.6)
RBC # BLD AUTO: 4.75 M/UL (ref 4.2–5.4)
SODIUM SERPL-SCNC: 140 MMOL/L (ref 135–145)
WBC # BLD AUTO: 6.9 K/UL (ref 4.8–10.8)

## 2019-11-12 PROCEDURE — 71275 CT ANGIOGRAPHY CHEST: CPT

## 2019-11-12 PROCEDURE — 85025 COMPLETE CBC W/AUTO DIFF WBC: CPT

## 2019-11-12 PROCEDURE — 99284 EMERGENCY DEPT VISIT MOD MDM: CPT

## 2019-11-12 PROCEDURE — 85610 PROTHROMBIN TIME: CPT

## 2019-11-12 PROCEDURE — 85730 THROMBOPLASTIN TIME PARTIAL: CPT

## 2019-11-12 PROCEDURE — 80053 COMPREHEN METABOLIC PANEL: CPT

## 2019-11-12 PROCEDURE — 700117 HCHG RX CONTRAST REV CODE 255: Performed by: EMERGENCY MEDICINE

## 2019-11-12 RX ADMIN — IOHEXOL 30 ML: 350 INJECTION, SOLUTION INTRAVENOUS at 16:15

## 2019-11-12 NOTE — ED PROVIDER NOTES
ED Provider Note    Scribed for Demarco Castillo M.D. by Carley Canseco. 11/12/2019, 3:19 PM.    Primary care provider: Pcp Pt States None  Means of arrival: Walk in  History obtained from: patient   History limited by: none    CHIEF COMPLAINT  Chief Complaint   Patient presents with   • Rib Pain       HPI  Antoinette Guillen is a 29 y.o. female who presents to the Emergency Department with worsening rib pain onset 1 week ago. She describes her pain as consistently dull but it is exacerbated with coughing, laughing and sneezing. Over the past week she has not seen any improvements and has not takes OTC medication for her pain. Denies leg swelling or pain. 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 Additionally she has a Nexplanon insert for BC and her LMP was last month.       pluracy aleve becaue of risk factors check for something more serious    REVIEW OF SYSTEMS  Pertinent positives include rib pain. Pertinent negatives include no leg swelling or leg pain. As above, all other systems reviewed and are negative.   See HPI for further details.     PAST MEDICAL HISTORY   has a past medical history of Factor VIII deficiency (HCC).    SURGICAL HISTORY  patient denies any surgical history    SOCIAL HISTORY  Social History     Tobacco Use   • Smoking status: Unknown If Ever Smoked   • Smokeless tobacco: Never Used   Substance Use Topics   • Alcohol use: No   • Drug use: Yes     Types: Marijuana     Comment: thc      Social History     Substance and Sexual Activity   Drug Use Yes   • Types: Marijuana    Comment: thc       FAMILY HISTORY  History reviewed. No pertinent family history.    CURRENT MEDICATIONS  Home Medications     Reviewed by Jesenia Carreno (Pharmacy Tech) on 11/12/19 at 1549  Med List Status: Complete   Medication Last Dose Status   etonogestrel (NEXPLANON) 68 MG Implant implant CONT Active                ALLERGIES  No Known  Allergies    PHYSICAL EXAM  VITAL SIGNS: /66   Pulse 100   Temp 36.6 °C (97.9 °F) (Temporal)   Resp 18   Wt 50.3 kg (110 lb 14.3 oz)   SpO2 98%   BMI 18.45 kg/m²   Vitals reviewed.    Constitutional: Alert in no apparent distress.  HENT: No signs of trauma, Bilateral external ears normal, Nose normal.   Eyes: Pupils are equal and reactive, Conjunctiva normal, Non-icteric.   Neck: Normal range of motion, No tenderness, Supple, No stridor.   Lymphatic: No lymphadenopathy noted.   Cardiovascular: Regular rate and rhythm, no murmurs.   Thorax & Lungs: Normal breath sounds, No respiratory distress, No wheezing, No chest tenderness.   Abdomen: Bowel sounds normal, Soft, No tenderness, No peritoneal signs, No masses, No pulsatile masses.   Skin: Warm, Dry, No erythema, No rash.   Back: No bony tenderness, No CVA tenderness.   Extremities: Intact distal pulses, No edema, No tenderness, No cyanosis  Musculoskeletal: Good range of motion in all major joints. No tenderness to palpation or major deformities noted.   Neurologic: Alert , Normal motor function, Normal sensory function, No focal deficits noted.   Psychiatric: Affect normal, Judgment normal, Mood normal.     DIAGNOSTIC STUDIES / PROCEDURES    LABS  Labs Reviewed   CBC WITH DIFFERENTIAL - Abnormal; Notable for the following components:       Result Value    MCHC 32.9 (*)     Neutrophils-Polys 76.50 (*)     Lymphocytes 17.30 (*)     All other components within normal limits    Narrative:     Indicate which anticoagulants the patient is on:->UNKNOWN   COMP METABOLIC PANEL    Narrative:     Indicate which anticoagulants the patient is on:->UNKNOWN   PROTHROMBIN TIME    Narrative:     Indicate which anticoagulants the patient is on:->UNKNOWN   APTT    Narrative:     Indicate which anticoagulants the patient is on:->UNKNOWN   ESTIMATED GFR    Narrative:     Indicate which anticoagulants the patient is on:->UNKNOWN      All labs reviewed by  me.      RADIOLOGY  CT-CTA CHEST PULMONARY ARTERY W/ RECONS   Final Result      No central or segmental pulmonary embolus is identified.      No acute cardiopulmonary process is seen.                    The radiologist's interpretation of all radiological studies have been reviewed by me.    COURSE & MEDICAL DECISION MAKING  Nursing notes, VS, PMSFHx reviewed in chart.  Differential diagnoses include but not limited to: pleurisy vs costochondritis     3:19 PM Patient seen and examined at bedside. I discussed that because of the patients hemophilia disorder and other risk factors we cannot rule out a PE. We will do a CT to be sure that is not what is causing her symptoms. I discussed that I think it is unlikely a PE but more than likely pleurisy. Ordered for CT CTA chest, CBC w/ diff, CMP, PT/INR, and APTT to evaluate. Patient will be treated with Iohexol 350 mg/mL for her symptoms.     The patient was not ruled out for PE by PERC criteria: Heart rate 100 and on BC    AGE < 50  No hx of: DVT/PE or hemoptysis  No unilateral leg swelling  Pulse Ox > 94%v     4:35 PM Patient was reevaluated at bedside. Discussed radiology results with the patient and informed them that the CT was negative so a PE is unlikely. I advised the her symptoms are consistent with pleurisy      4:58 PM I spoke with the patient's hematologist Dr. Cyr who reports that NSAIDs are not contraindicated with her factor VIII deficiency condition.  She will take Aleve.  She will return if worse.      FINAL IMPRESSION  1. Pleuritic chest pain          Carley SHAW), am scribing for, and in the presence of, Demarco Castillo M.D..    Electronically signed by: Carley Bingham), 11/12/2019    Demarco SHAW M.D. personally performed the services described in this documentation, as scribed by Carley Canseco in my presence, and it is both accurate and complete. C    The note accurately reflects work and decisions made by me.  Demarco WALTERS  Kristofer Valdez  11/12/2019  4:59 PM

## 2019-11-12 NOTE — ED TRIAGE NOTES
Amb to triage w/ c/o R anterior lower rib pain x 1 wk.  Reports pain increases w/ inspiration, movement, palpation.  No distress noted.

## 2019-11-13 NOTE — DISCHARGE INSTRUCTIONS
Pleurisy  Pleurisy, also called pleuritis, is irritation and swelling (inflammation) of the linings of the lungs. The linings of the lungs are called pleura. They cover the outside of the lungs and the inside of the chest wall. There is a small amount of fluid (pleural fluid) between the pleura that allows the lungs to move in and out smoothly when you breathe. Pleurisy causes the pleura to be rough and dry and to rub together when you breathe, which is painful. In some cases, pleurisy can cause pleural fluid to build up between the pleura (pleural effusion).  What are the causes?  Common causes of this condition include:  · A lung infection caused by bacteria or a virus.  · A blood clot that travels to the lung (pulmonary embolism).  · Air leaking into the pleural space (pneumothorax).  · Lung cancer or a lung tumor.  · A chest injury.  · Diseases that can cause lung inflammation. These include rheumatoid arthritis, lupus, sickle cell disease, inflammatory bowel disease, and pancreatitis.  · Heart or chest surgery.  · Lung damage from inhaling asbestos.  · A lung reaction to certain medicines.  Sometimes the cause is unknown.  What are the signs or symptoms?  Chest pain is the main symptom of this condition. The pain is usually on one side. Chest pain may start suddenly and be sharp or stabbing. It may become a constant dull ache. You may also feel pain in your back or shoulder. The pain may get worse when you cough, take deep breaths, or make sudden movements. Other symptoms may include:  · Shortness of breath.  · Noisy breathing (wheezing).  · Cough.  · Chills.  · Fever.  How is this diagnosed?  This condition may be diagnosed based on:  · Your medical history.  · Your symptoms.  · A physical exam. Your health care provider will listen to your breathing with a stethoscope to check for a rough, rubbing sound (friction rub). If you have pleural effusion, your breathing sounds may be muffled.  · Tests, such  as:  ¨ Blood tests to check for infections or diseases and to measure the oxygen in your blood.  ¨ Imaging studies of your lungs. These may include a chest X-ray, ultrasound, MRI, or CT scan.  ¨ A procedure to remove pleural fluid with a needle for testing (thoracentesis).  How is this treated?  Treatment for this condition depends on the cause. Pleurisy that was caused by a virus usually clears up within 2 weeks. Treatment for pleurisy may include:  · NSAIDs to help relieve pain and swelling.  · Antibiotic medicines, if your condition was caused by a bacterial infection.  · Prescription pain or cough medicine.  · Medicines to dissolve a blood clot, if your condition was caused by pulmonary embolism.  · Removal of pleural fluid or air.  Follow these instructions at home:  Medicines  · Take over-the-counter and prescription medicines only as told by your health care provider.  · If you were prescribed an antibiotic, take it as told by your health care provider. Do not stop taking the antibiotic even if you start to feel better.  Activity  · Rest and return to your normal activities as told by your health care provider. Ask your health care provider what activities are safe for you.  · Do not drive or use heavy machinery while taking prescription pain medicine.  General instructions  · Monitor your pleurisy for any changes.  · Take deep breaths often, even if it is painful. This can help prevent lung infection (pneumonia) and collapse of lung tissue (atelectasis).  · When lying down, lie on your painful side. This may reduce pain.  · Do not smoke. If you need help quitting, ask your health care provider.  · Keep all follow-up visits as told by your health care provider. This is important.  Contact a health care provider if:  · You have pain that:  ¨ Gets worse.  ¨ Does not get better with medicine.  ¨ Lasts for more than 1 week.  · You have a fever or chills.  · Your cough or shortness of breath is not improving at  home.  · You cough up pus-like (purulent) secretions.  Get help right away if:  · Your lips, fingernails, or toenails darken or turn blue.  · You cough up blood.  · You have any of the following symptoms that get worse:  ¨ Difficulty breathing.  ¨ Shortness of breath.  ¨ Wheezing.  · You have pain that spreads into your neck, arms, or jaw.  · You develop a rash.  · You vomit.  · You faint.  Summary  · Pleurisy is inflammation of the linings of the lungs (pleura).  · Pleurisy causes pain that makes it difficult for you to breathe or cough.  · Pleurisy is often caused by an underlying infection or disease.  · Treatment of pleurisy depends on the cause, and it often includes medicines.  This information is not intended to replace advice given to you by your health care provider. Make sure you discuss any questions you have with your health care provider.  Document Released: 12/18/2006 Document Revised: 09/11/2017 Document Reviewed: 09/11/2017  [employer] Interactive Patient Education © 2017 [employer]

## 2020-10-05 ENCOUNTER — TELEPHONE (OUTPATIENT)
Dept: MEDICAL GROUP | Facility: PHYSICIAN GROUP | Age: 30
End: 2020-10-05

## 2020-10-12 ENCOUNTER — TELEPHONE (OUTPATIENT)
Dept: SCHEDULING | Facility: IMAGING CENTER | Age: 30
End: 2020-10-12

## 2020-10-22 ENCOUNTER — TELEMEDICINE (OUTPATIENT)
Dept: MEDICAL GROUP | Age: 30
End: 2020-10-22
Payer: COMMERCIAL

## 2020-10-22 VITALS — HEIGHT: 64 IN | TEMPERATURE: 97.5 F | BODY MASS INDEX: 17.93 KG/M2 | WEIGHT: 105 LBS

## 2020-10-22 DIAGNOSIS — Z00.00 ANNUAL PHYSICAL EXAM: ICD-10-CM

## 2020-10-22 DIAGNOSIS — Z97.5 NEXPLANON IN PLACE: ICD-10-CM

## 2020-10-22 DIAGNOSIS — Z87.42 HISTORY OF MENORRHAGIA: ICD-10-CM

## 2020-10-22 DIAGNOSIS — R63.4 WEIGHT LOSS: ICD-10-CM

## 2020-10-22 DIAGNOSIS — D68.2 FACTOR VII DEFICIENCY (HCC): ICD-10-CM

## 2020-10-22 DIAGNOSIS — R23.3 EASY BRUISING: ICD-10-CM

## 2020-10-22 DIAGNOSIS — Z00.00 HEALTH CARE MAINTENANCE: ICD-10-CM

## 2020-10-22 DIAGNOSIS — Z71.85 IMMUNIZATION COUNSELING: ICD-10-CM

## 2020-10-22 PROBLEM — R53.83 FATIGUE: Status: RESOLVED | Noted: 2019-05-15 | Resolved: 2020-10-22

## 2020-10-22 PROCEDURE — 99395 PREV VISIT EST AGE 18-39: CPT | Mod: 95,CR | Performed by: INTERNAL MEDICINE

## 2020-10-22 PROCEDURE — 99214 OFFICE O/P EST MOD 30 MIN: CPT | Mod: 25,95,CR | Performed by: INTERNAL MEDICINE

## 2020-10-22 ASSESSMENT — ANXIETY QUESTIONNAIRES
7. FEELING AFRAID AS IF SOMETHING AWFUL MIGHT HAPPEN: SEVERAL DAYS
3. WORRYING TOO MUCH ABOUT DIFFERENT THINGS: SEVERAL DAYS
2. NOT BEING ABLE TO STOP OR CONTROL WORRYING: SEVERAL DAYS
5. BEING SO RESTLESS THAT IT IS HARD TO SIT STILL: SEVERAL DAYS
GAD7 TOTAL SCORE: 7
1. FEELING NERVOUS, ANXIOUS, OR ON EDGE: SEVERAL DAYS
4. TROUBLE RELAXING: SEVERAL DAYS
6. BECOMING EASILY ANNOYED OR IRRITABLE: SEVERAL DAYS

## 2020-10-22 ASSESSMENT — FIBROSIS 4 INDEX: FIB4 SCORE: 0.72

## 2020-10-22 ASSESSMENT — PATIENT HEALTH QUESTIONNAIRE - PHQ9: CLINICAL INTERPRETATION OF PHQ2 SCORE: 0

## 2020-10-22 NOTE — PROGRESS NOTES
Telemedicine Visit: Established Patient     This Remote Face to Face encounter was conducted via Zoom. Given the importance of social distancing and other strategies recommended to reduce the risk of COVID-19 transmission, I am providing medical care to this patient via audio/video visit in place of an in person visit at the request of the patient. Verbal consent to telehealth, risks, benefits, and consequences were discussed. Patient retains the right to withdraw at any time. All existing confidentiality protections apply. The patient has access to all transmitted medical information. No dissemination of any patient images or information to other entities without further written consent.    CHIEF COMPLAINT     Chief Complaint   Patient presents with   • Establish Care   • General health     dental   • Referral Needed     hematologist     HPI  Antoinette Guillen is a 30 y.o. female who presents today for the following     HCM  Recommendations:  Regular exercise at least 4 days a week  Diet: advised  > 1800 kcal/d  Dental exam at least 1-2 times per year  Sunscreen use: advised    Immunization counseling:  TdaP:  advised  Influenza: advised    GYN  Previous PAP:   due, normal   Abnormal PAP: no    Menses rare due to nexplanone  No excess cramping or bleeding.   Takes OTC analgesics for cramping  Contraception: Nexplanone    FVII deficiency, Easy bruising, H/o menorrhagia  30 y.o. year old female, f/u by hematology due to coagulation factor deficiency, needs referral.    She has have excessive spontaneous bruising of the upper / lower extremities and excessive bleeding.    She was waking up with excessive bruising of both her upper lower extremities November, 2018; she did not have period for 2 years since being on nexplanon, but does report history of menorrhagia going through a box of tampons for her periods, which lasted 5 to 6 days.    She also had excessive bleeding from a tooth extraction ~ 2 years ago.     Patient denied taking any antiplatelet or anticoagulation medications.      Her PT was slightly prolonged.  PTT and von Willebrand disease profile (multiple neuro analysis was not done) as well as platelet functions were normal.    Factor VII was noted to be slightly low (55) and normal mixing study suggested a factor deficiency rather than presence of an inhibitor.     Her WT remains low, with BMI 18.    Patient was supplemented with vitamin K with hematology.    She will need dental procedure, needs hematology clearance / referral.     Interim history  -Patient now taking vitamin K.  -INR normalized from 1.21 to 1.09.  -2/20/19 CT scan abdomen and pelvis:  1. Mild splenomegaly.  2. No enlarged lymph nodes.  3. No acute inflammatory change in the abdomen or pelvis.  -HIV, hepatitis panel, CARLOS negative.  -No further episodes of spontaneous bruising or significant bleeding.  -She does report breakthrough bleeding/spotting on Norplant recently, which has been mild.  -Has not seen GI yet.  -No more significant weight loss although continuing to have decreased appetite.  -Recently fatigued and having left upper quadrant pain.     WT loss, Body mass index is 18.02 kg/m².  The patient has have WT loss for > 1 year, with BMI that remained low < 19.  She does not do анна count.  Stated that many family members are obese.    Reviewed PMH, PSH, FH, SH, ALL, HCM/IMM, no changes  Reviewed MEDS, no changes    Patient Active Problem List    Diagnosis Date Noted   • History of menorrhagia 05/15/2019   • Factor VII deficiency (HCC) 05/15/2019   • Fatigue 05/15/2019   • Easy bruising 02/13/2019   • Weight loss 02/13/2019     CURRENT MEDICATIONS  Current Outpatient Medications   Medication Sig Dispense Refill   • etonogestrel (NEXPLANON) 68 MG Implant implant Inject 1 Each as instructed Once.       No current facility-administered medications for this visit.      ALLERGIES  Allergies: Patient has no known allergies.  PAST MEDICAL  "HISTORY  Past Medical History:   Diagnosis Date   • Factor VIII deficiency (HCC)      SURGICAL HISTORY  She  has no past surgical history on file.  SOCIAL HISTORY  Social History     Tobacco Use   • Smoking status: Unknown If Ever Smoked   • Smokeless tobacco: Never Used   Substance Use Topics   • Alcohol use: No   • Drug use: Yes     Types: Marijuana     Comment: thc     Social History     Social History Narrative   • Not on file     FAMILY HISTORY  History reviewed. No pertinent family history.  No family status information on file.       ROS   Constitutional: Negative for fever, chills, fatigue.  HENT: Negative for congestion, sore throat.  Eyes: Negative for vision problems.   Respiratory: Negative for cough, shortness of breath.  Cardiovascular: Negative for chest pain, palpitations.   Gastrointestinal: Negative for heartburn, nausea, abdominal pain.   Genitourinary: Negative for dysuria.  Musculoskeletal: Negative for significant myalgia, back and joint pain.   Skin: Negative for rash.   Neuro: Negative for dizziness, weakness and headaches.   Endo/Heme/Allergies: Does not bruise/bleed easily.   Psychiatric/Behavioral: Negative for depression.    Objective   Vitals obtained by patient:  Temperature 36.4 °C (97.5 °F)   Height 1.626 m (5' 4\")   Weight 47.6 kg (105 lb)   Body Mass Index 18.02 kg/m²   Physical Exam:  Constitutional: Alert, no distress, well-groomed.  Skin: No rash in visible areas.  Eye: Round. Conjunctiva clear, lids normal.  ENMT: Lips pink without lesions, good dentition. Phonation normal.  Neck: No visible masses or thyromegaly. Moves freely without pain.  CV: no peripheral cyanosis, tachycardia.  Respiratory: Unlabored respiratory effort, no cough or audible wheezing.  Psych: Alert and oriented x3, normal affect and mood.     Labs     Labs are reviewed and discussed with a patient  No results found for: CHOLSTRLTOT, LDL, HDL, TRIGLYCERIDE    Lab Results   Component Value Date/Time    " SODIUM 140 11/12/2019 03:48 PM    POTASSIUM 3.8 11/12/2019 03:48 PM    CHLORIDE 104 11/12/2019 03:48 PM    CO2 27 11/12/2019 03:48 PM    GLUCOSE 91 11/12/2019 03:48 PM    BUN 13 11/12/2019 03:48 PM    CREATININE 0.74 11/12/2019 03:48 PM     Lab Results   Component Value Date/Time    ALKPHOSPHAT 38 11/12/2019 03:48 PM    ASTSGOT 17 11/12/2019 03:48 PM    ALTSGPT 9 11/12/2019 03:48 PM    TBILIRUBIN 0.5 11/12/2019 03:48 PM      Lab Results   Component Value Date/Time    WBC 6.9 11/12/2019 03:48 PM    RBC 4.75 11/12/2019 03:48 PM    HEMOGLOBIN 14.1 11/12/2019 03:48 PM    HEMATOCRIT 42.8 11/12/2019 03:48 PM    MCV 90.1 11/12/2019 03:48 PM    MCH 29.7 11/12/2019 03:48 PM    MCHC 32.9 (L) 11/12/2019 03:48 PM    MPV 9.9 11/12/2019 03:48 PM    NEUTSPOLYS 76.50 (H) 11/12/2019 03:48 PM    LYMPHOCYTES 17.30 (L) 11/12/2019 03:48 PM    MONOCYTES 5.40 11/12/2019 03:48 PM    EOSINOPHILS 0.40 11/12/2019 03:48 PM    BASOPHILS 0.30 11/12/2019 03:48 PM      Imaging      None    Assessment and Plan     Antoinette Guillen is a 30 y.o. female    1. Annual physical exam  Reviewed PMH, PSH, FH, SH, ALL, MEDS, HCM/IMM.     2. Health care maintenance  Per HPI  Pending PAP, will notify office    3. Immunization counseling  Per HPI    4. Nexplanon in place  No adverse effects    5. Factor VII deficiency (HCC)  - REFERRAL TO HEMATOLOGY ONCOLOGY Referral to? [facility]  6. Easy bruising  7. History of menorrhagia    8. Weight loss  9. Body mass index (BMI) less than 19  Advised at least 1800 kcal diet, ensure, анна count    Follow-up: in 1 year, and prn

## 2020-11-10 ENCOUNTER — HOSPITAL ENCOUNTER (OUTPATIENT)
Dept: LAB | Facility: MEDICAL CENTER | Age: 30
End: 2020-11-10
Attending: INTERNAL MEDICINE
Payer: COMMERCIAL